# Patient Record
Sex: FEMALE | Race: WHITE | NOT HISPANIC OR LATINO | Employment: OTHER | URBAN - METROPOLITAN AREA
[De-identification: names, ages, dates, MRNs, and addresses within clinical notes are randomized per-mention and may not be internally consistent; named-entity substitution may affect disease eponyms.]

---

## 2017-05-27 ENCOUNTER — TELEPHONE (OUTPATIENT)
Dept: HEMATOLOGY/ONCOLOGY | Facility: CLINIC | Age: 52
End: 2017-05-27

## 2017-06-07 ENCOUNTER — TELEPHONE (OUTPATIENT)
Dept: HEMATOLOGY/ONCOLOGY | Facility: CLINIC | Age: 52
End: 2017-06-07

## 2017-06-07 NOTE — TELEPHONE ENCOUNTER
----- Message from Kalyn Saxena sent at 5/30/2017  9:11 AM CDT -----  No RTC appt      ----- Message -----  From: CHANI Briones MD  Sent: 5/28/2017  10:52 AM  To: Kalyn Saxena    Is she coming in 5/30 week?

## 2017-06-22 ENCOUNTER — OFFICE VISIT (OUTPATIENT)
Dept: HEMATOLOGY/ONCOLOGY | Facility: CLINIC | Age: 52
End: 2017-06-22
Payer: COMMERCIAL

## 2017-06-22 VITALS
RESPIRATION RATE: 18 BRPM | DIASTOLIC BLOOD PRESSURE: 72 MMHG | HEART RATE: 76 BPM | TEMPERATURE: 98 F | WEIGHT: 148 LBS | SYSTOLIC BLOOD PRESSURE: 168 MMHG

## 2017-06-22 DIAGNOSIS — R59.0 LYMPHADENOPATHY, MEDIASTINAL: ICD-10-CM

## 2017-06-22 DIAGNOSIS — E22.2 SYNDROME OF INAPPROPRIATE ADH (SIADH) SECRETION: ICD-10-CM

## 2017-06-22 DIAGNOSIS — G89.29 CHRONIC BILATERAL LOW BACK PAIN WITHOUT SCIATICA: ICD-10-CM

## 2017-06-22 DIAGNOSIS — R91.8 MASS OF LOWER LOBE OF RIGHT LUNG: ICD-10-CM

## 2017-06-22 DIAGNOSIS — M54.50 CHRONIC BILATERAL LOW BACK PAIN WITHOUT SCIATICA: ICD-10-CM

## 2017-06-22 PROCEDURE — 99204 OFFICE O/P NEW MOD 45 MIN: CPT | Mod: ,,, | Performed by: INTERNAL MEDICINE

## 2017-06-22 RX ORDER — HYDROCODONE BITARTRATE AND ACETAMINOPHEN 5; 325 MG/1; MG/1
TABLET ORAL
COMMUNITY
Start: 2017-06-08 | End: 2017-06-30 | Stop reason: SDUPTHER

## 2017-06-22 NOTE — LETTER
June 22, 2017      Ricci Guerra MD  [address]  [facility] [place] [address]           [facility]  [address] [facility] [place] [address]          Patient: Maile Light   MR Number: [id number]   YOB: 1965   Date of Visit: 6/22/2017       Dear Dr. Ricci Guerra:    Thank you for referring Maile Light to me for evaluation. Attached you will find relevant portions of my assessment and plan of care.    If you have questions, please do not hesitate to call me. I look forward to following Maile Light along with you.    Sincerely,    CHANI Briones MD    Enclosure  CC:  No Recipients    If you would like to receive this communication electronically, please contact [email or web address] or [phone] to request more information on [employer] Link access.    For providers and/or their staff who would like to refer a patient to [facility], please contact us through our one-stop-shop provider referral line, [facility], at [phone].    If you feel you have received this communication in error or would no longer like to receive these types of communications, please e-mail [email or web address]

## 2017-06-22 NOTE — LETTER
June 22, 2017        Ricci Guerra MD  [address]  [facility] [place] [address]             [facility]  [address] [facility] [place] [address]   Patient: Maile Light   MR Number: [id number]   YOB: 1965   Date of Visit: 6/22/2017       Dear Dr. Guerra:    Thank you for referring Maile Light to me for evaluation. Attached you will find relevant portions of my assessment and plan of care.    If you have questions, please do not hesitate to call me. I look forward to following Maile Light along with you.    Sincerely,      CHANI Briones MD            CC  No Recipients    Enclosure

## 2017-06-23 ENCOUNTER — TELEPHONE (OUTPATIENT)
Dept: PULMONOLOGY | Facility: CLINIC | Age: 52
End: 2017-06-23

## 2017-06-23 NOTE — PROGRESS NOTES
[facility] History & Physical    Subjective:      Patient ID:   Maile Light  51 y.o. female  1965                                                                                 MD Steve Jennings MD      Chief Complaint:   RLL lung mass    HPI:  51 y.o. female with diagnosis of  Evaluation for elective breast reduction.  Found to have  Na 116-118.  Seen in ER, mass RLL.  SOB +, non productive cough.  No hemoptysis.  Appetite and weight stable.  Smoke 1 ppd x's 25 years.  I spoke with her about [facility], she would like a referral to the clinic.    HTN, LBP,hernia repair x's 7, mesh in place.  She wants breast reduction.    Allergy no.  Job micah,     Extensive abdomenal surgery, colostomy reversal, Dr. Eugene Parrish,   [facility], 3 years ago.  Lumbar back surgery x's 1.    Mom Ca ? Type., paralysis.  Dad GI cancer, colostomy.  10 Br and Sist, heart dx, renal dx, MVA death.    ROS:   GEN: normal without any fever, night sweats or weight loss  HEENT: normal with no HA's, sore throat, stiff neck, changes in vision  CV: normal with no CP, SOB, PND, DE OLIVEIRA or orthopnea  PULM: See HPI.   no SOB, cough, hemoptysis, sputum or pleuritic pain  GI: See HPI.   no abdominal pain, nausea, vomiting, constipation, diarrhea, melanotic stools, BRBPR, or hematemesis  : normal with no hematuria, dysuria  BREAST: normal with no mass, discharge, pain  SKIN: normal with no rash, erythema, bruising, or swelling       Social History     Social History    Marital status:      Spouse name: N/A    Number of children: N/A    Years of education: N/A     Occupational History    Not on file.     Social History Main Topics    Smoking status: Current Every Day Smoker     Types: Cigarettes    Smokeless tobacco: Never Used    Alcohol use Yes    Drug use: No    Sexual activity: No     Other Topics Concern    Not on file     Social History Narrative    No narrative on file          Current Outpatient Prescriptions:     hydrocodone-acetaminophen 5-325mg (NORCO) 5-325 mg per tablet, , Disp: , Rfl:           Objective:   Vitals:  Blood pressure (!) 168/72, pulse 76, temperature 98.3 °F (36.8 °C), resp. rate 18, weight 67.1 kg (148 lb).    Physical Examination:   GEN: no apparent distress, comfortable; AAOx3  HEAD: atraumatic and normocephalic  EYES: no pallor, no icterus, PERRLA  ENT: OMM, no pharyngeal erythema, external ears WNL; no nasal discharge; no thrush  NECK: no masses, thyroid normal, trachea midline, no LAD/LN's, supple  CV: RRR with no murmur; normal pulse; normal S1 and S2; no pedal edema  CHEST: Normal respiratory effort; CTAB; normal breath sounds; no wheeze or crackles  ABDOM: nontender and nondistended; soft; normal bowel sounds; no rebound/guarding  MUSC/Skeletal: ROM normal; no crepitus; joints normal  EXTREM: no clubbing, cyanosis, inflammation or swelling, no edema or calf tenderness  SKIN: She has a sebaceous cyst on posterior R shoulder,  no rashes, lesions, ulcers, petechiae   : no sousa  NEURO: grossly intact; motor/sensory WNL; AAOx3; no tremors  PSYCH: normal mood, affect and behavior  LYMPH: normal cervical, supraclavicular, axillary and groin LN's  Breasts: NT, no palpable mass at L or R breast    I have reviewed all available lab results and radiology reports.    Na 116-118.     Radiology/Diagnostic Studies:    CAT RLL mass, ? RML nodule, increased hilar and mediastinal LN.      All lab results and imaging results have been reviewed and discussed with the patient    Assessment:   (1) 51 y.o. female with diagnosis of hyponatremia, SIADH likely.    (2)RLL mass and local mediastinoscopy, likely Small cell lung Cancer.    (3)Refer Dr. Toleod for bronchoscopy and bx for tissue dx.    (4)Staging studies MRI of brain, PET scan [facility]. Check for metastatic dx?    (5)RTC 2 weeks.    (6)Treatment options could include chemotherapy and radiation concurrently or  sequentially.    (7)Chronic low back pain.        1. Mass of lower lobe of right lung    2. Lymphadenopathy, mediastinal    3. Syndrome of inappropriate ADH (SIADH) secretion    4. Chronic bilateral low back pain without sciatica        Plan:       Mass of lower lobe of right lung    Lymphadenopathy, mediastinal    Syndrome of inappropriate ADH (SIADH) secretion    Chronic bilateral low back pain without sciatica          I have explained and the patient understands all of  the current recommendation(s). I have answered all of their questions to the best of my ability and to their complete satisfaction.             Thank you for allowing me to participate in this pleasant patient's care. Please call with any questions or concerns.

## 2017-06-23 NOTE — TELEPHONE ENCOUNTER
----- Message from Diamond Richie sent at 6/22/2017  4:54 PM CDT -----  Contact: Dr Anselmo Briones calling to schedule a new patient appt. The patient has a right lung mass/ small cancer. She is being referred by Dr CHANI Briones. First avail appt is 8/29/17. She would like to be seen sooner. Please advise.  Call back the patient at .  Thanks!

## 2017-06-23 NOTE — TELEPHONE ENCOUNTER
Dr. GISSELLE Briones's office called and wanted the patient in sooner than 7/25/2017 so I gave her an appt for 7/13/2017 at 8:00am.

## 2017-06-30 ENCOUNTER — TELEPHONE (OUTPATIENT)
Dept: PULMONOLOGY | Facility: CLINIC | Age: 52
End: 2017-06-30

## 2017-06-30 ENCOUNTER — OFFICE VISIT (OUTPATIENT)
Dept: PULMONOLOGY | Facility: CLINIC | Age: 52
End: 2017-06-30
Payer: COMMERCIAL

## 2017-06-30 VITALS
HEIGHT: 59 IN | HEART RATE: 90 BPM | WEIGHT: 147.5 LBS | OXYGEN SATURATION: 98 % | BODY MASS INDEX: 29.73 KG/M2 | DIASTOLIC BLOOD PRESSURE: 93 MMHG | SYSTOLIC BLOOD PRESSURE: 170 MMHG

## 2017-06-30 DIAGNOSIS — E22.2 SYNDROME OF INAPPROPRIATE ADH (SIADH) SECRETION: ICD-10-CM

## 2017-06-30 DIAGNOSIS — F17.200 SMOKER: ICD-10-CM

## 2017-06-30 DIAGNOSIS — E22.2 SIADH (SYNDROME OF INAPPROPRIATE ADH PRODUCTION): ICD-10-CM

## 2017-06-30 DIAGNOSIS — R05.3 CHRONIC COUGH: ICD-10-CM

## 2017-06-30 DIAGNOSIS — J44.1 COPD EXACERBATION: ICD-10-CM

## 2017-06-30 DIAGNOSIS — F41.9 ANXIETY: ICD-10-CM

## 2017-06-30 DIAGNOSIS — R59.0 LYMPHADENOPATHY, MEDIASTINAL: Primary | ICD-10-CM

## 2017-06-30 DIAGNOSIS — R91.8 MASS OF UPPER LOBE OF RIGHT LUNG: ICD-10-CM

## 2017-06-30 PROCEDURE — 99205 OFFICE O/P NEW HI 60 MIN: CPT | Mod: S$GLB,,, | Performed by: INTERNAL MEDICINE

## 2017-06-30 PROCEDURE — 99999 PR PBB SHADOW E&M-EST. PATIENT-LVL III: CPT | Mod: PBBFAC,,, | Performed by: INTERNAL MEDICINE

## 2017-06-30 RX ORDER — CODEINE PHOSPHATE AND GUAIFENESIN 10; 100 MG/5ML; MG/5ML
5 SOLUTION ORAL EVERY 4 HOURS PRN
Qty: 473 ML | Refills: 0 | Status: SHIPPED | OUTPATIENT
Start: 2017-06-30 | End: 2017-07-10

## 2017-06-30 RX ORDER — ALPRAZOLAM 0.25 MG/1
0.25 TABLET ORAL 3 TIMES DAILY PRN
Qty: 90 TABLET | Refills: 2 | Status: SHIPPED | OUTPATIENT
Start: 2017-06-30 | End: 2017-08-07

## 2017-06-30 RX ORDER — ALBUTEROL SULFATE 90 UG/1
AEROSOL, METERED RESPIRATORY (INHALATION)
Qty: 1 INHALER | Refills: 11 | Status: SHIPPED | OUTPATIENT
Start: 2017-06-30

## 2017-06-30 RX ORDER — FLUTICASONE FUROATE AND VILANTEROL 200; 25 UG/1; UG/1
1 POWDER RESPIRATORY (INHALATION) DAILY
Qty: 1 EACH | Refills: 11 | Status: SHIPPED | OUTPATIENT
Start: 2017-06-30

## 2017-06-30 RX ORDER — PREDNISONE 20 MG/1
TABLET ORAL
Qty: 24 TABLET | Refills: 0 | Status: SHIPPED | OUTPATIENT
Start: 2017-06-30

## 2017-06-30 RX ORDER — HYDROCODONE BITARTRATE AND ACETAMINOPHEN 5; 325 MG/1; MG/1
1 TABLET ORAL EVERY 4 HOURS PRN
Qty: 150 TABLET | Refills: 0 | Status: SHIPPED | OUTPATIENT
Start: 2017-06-30 | End: 2017-08-03 | Stop reason: SDUPTHER

## 2017-06-30 NOTE — PATIENT INSTRUCTIONS
Anxiety - use xanax as needed up to 3 times daily    Cough suppression - use guafenesin and codeine as needed,  May use norco next month after July 8, do not get narcotics from more than one doc.  Get future meds perhaps from Dr Briones or Dr Milian.        For bronchial problem   breo one a day   Albuterol as needed - 2 every 4 for cough or wheezes.   Prednisone 2 daily  fro 3 days and then one daily for 3 days.    Fluid restrict to one liter fluid total daily, check sodium Monday.    Scope test Monday with biopsy right upper lung anteriorly and do needle biopsy large lymph node.        Your xays and picture looks like small cell lung cancer.  Some of cough is chronic from bronchitis, but with coughing causing vomiting and apparent cancer- would try to suppress as able with narcotics.

## 2017-06-30 NOTE — TELEPHONE ENCOUNTER
----- Message from Steve Toledo MD sent at 6/30/2017  4:25 PM CDT -----  Contact: Shila with [email or web address]  Ok    ----- Message -----  From: Bonny Foote MA  Sent: 6/30/2017   4:01 PM  To: Steve Toledo MD        ----- Message -----  From: Noe Watts  Sent: 6/30/2017   3:47 PM  To: Tre BOLIVAR Staff    Shila with [email or web address] pharmacy called to verify if script for cough syrup can be changed to Robitussin ac? Please call back at [phone] to advise

## 2017-06-30 NOTE — LETTER
June 30, 2017      CHANI Briones MD  [address]  [address]  [place] [address]           [facility] [facility]  [address]  [place] [address]  Phone: [phone]          Patient: Maile Light   MR Number: [id number]   YOB: 1965   Date of Visit: 6/30/2017       Dear Dr. CHANI Briones:    Thank you for referring Maile Light to me for evaluation. Attached you will find relevant portions of my assessment and plan of care.    If you have questions, please do not hesitate to call me. I look forward to following Maile Light along with you.    Sincerely,    Steve Toledo MD    Enclosure  CC:  No Recipients    If you would like to receive this communication electronically, please contact [email or web address] or [phone] to request more information on [employer] Link access.    For providers and/or their staff who would like to refer a patient to [facility], please contact us through our one-stop-shop provider referral line, [facility] , at [phone].    If you feel you have received this communication in error or would no longer like to receive these types of communications, please e-mail [email or web address]

## 2017-06-30 NOTE — PROGRESS NOTES
"2017    Maile Light  [facility] Patient Consult    Chief Complaint   Patient presents with    Lung Mass       HPI:chronic smoker, no family history, mom  age 30, pt has had chronic cough for years, had colon surg 3 yrs ago with ventral hernias, pt had bowel obstruction.  Had problems with nocturnal wheezes and sob - no resp rx. No fh of asthma.     Pt was found to have low sodium.  cxr and ct done May 17th.  Na 116 to 118.  No bx. Done. Seen by dr Tre Briones. Needs dx.     Cough violent with emesis, no syncope        The chief compliant  problem is new to me",   PFSH:  History reviewed. No pertinent past medical history.      Past Surgical History:   Procedure Laterality Date     SECTION      COLON SURGERY      COLONOSCOPY      HERNIA MESH REMOVAL       Social History   Substance Use Topics    Smoking status: Current Every Day Smoker     Packs/day: 0.50     Years: 25.00     Types: Cigarettes    Smokeless tobacco: Never Used    Alcohol use Yes     Family History   Problem Relation Age of Onset    Cancer Mother     Cancer Father      Review of patient's allergies indicates:  No Known Allergies    Performance Status:The patient's activity level is functions out of house.      Review of Systems:  a review of eleven systems covering constitutional, Eye, HEENT, Psych, Respiratory, Cardiac, GI, , Musculoskeletal, Endocrine, Dermatologic was negative except for pertinent findings as listed ABOVE and below: all good     Exam:Comprehensive exam done. BP (!) 170/93 (BP Location: Left arm, Patient Position: Sitting, BP Method: Automatic)   Pulse 90   Ht 4' 11" (1.499 m)   Wt 66.9 kg (147 lb 7.8 oz)   SpO2 98%   BMI 29.79 kg/m²   Exam included Vitals as listed, and patient's appearance and affect and alertness and mood, oral exam for yeast and hygiene and pharynx lesions and Mallapatti (M) score, neck with inspection for jvd and masses and thyroid abnormalities and lymph nodes (supraclavicular " and infraclavicular nodes and axillary also examined and noted if abn), chest exam included symmetry and effort and fremitus and percussion and auscultation, cardiac exam included rhythm and gallops and murmur and rubs and jvd and edema, abdominal exam for mass and hepatosplenomegaly and tenderness and hernias and bowel sounds, Musculoskeletal exam with muscle tone and posture and mobility/gait and  strength, and skin for rashes and cyanosis and pallor and turgor, extremity for clubbing.  Findings were normal except for pertinent findings listed below: violent cough , good exam.         Radiographs (ct chest and cxr) reviewed: view by direct vision  May 17  2017 mediastinal mass and rul mass and patchy left upper abn    Labs none available        PFT was not done     Plan:  Clinical impression is resonably certain and repeated evaluation prn +/- follow up will be needed as below.    Maile was seen today for lung mass.    Diagnoses and all orders for this visit:    Lymphadenopathy, mediastinal  -     guaifenesin-codeine 100-10 mg/5 ml (TUSSI-ORGANIDIN NR)  mg/5 mL syrup; Take 5 mLs by mouth every 4 (four) hours as needed for Cough.  -     hydrocodone-acetaminophen 5-325mg (NORCO) 5-325 mg per tablet; Take 1 tablet by mouth every 4 (four) hours as needed (cough).  -     BRONCHOSCOPY HIRSCH NEEDLE TRANSBRONCHIAL; Future    Syndrome of inappropriate ADH (SIADH) secretion    Mass of upper lobe of right lung  -     guaifenesin-codeine 100-10 mg/5 ml (TUSSI-ORGANIDIN NR)  mg/5 mL syrup; Take 5 mLs by mouth every 4 (four) hours as needed for Cough.  -     hydrocodone-acetaminophen 5-325mg (NORCO) 5-325 mg per tablet; Take 1 tablet by mouth every 4 (four) hours as needed (cough).  -     BRONCHOSCOPY HIRSCH NEEDLE TRANSBRONCHIAL; Future    COPD exacerbation  -     fluticasone-vilanterol (BREO ELLIPTA) 200-25 mcg/dose DsDv diskus inhaler; Inhale 1 puff into the lungs once daily.  -     guaifenesin-codeine 100-10  mg/5 ml (TUSSI-ORGANIDIN NR)  mg/5 mL syrup; Take 5 mLs by mouth every 4 (four) hours as needed for Cough.  -     albuterol 90 mcg/actuation inhaler; 2 puffs every 4 hours as needed for cough, wheeze, or shortness of breath  -     predniSONE (DELTASONE) 20 MG tablet; Take 2 daily for 3 days then One daily for 3 days and repeat for flare of lung symptoms as intructed    Chronic cough  -     guaifenesin-codeine 100-10 mg/5 ml (TUSSI-ORGANIDIN NR)  mg/5 mL syrup; Take 5 mLs by mouth every 4 (four) hours as needed for Cough.  -     hydrocodone-acetaminophen 5-325mg (NORCO) 5-325 mg per tablet; Take 1 tablet by mouth every 4 (four) hours as needed (cough).    Smoker  -     guaifenesin-codeine 100-10 mg/5 ml (TUSSI-ORGANIDIN NR)  mg/5 mL syrup; Take 5 mLs by mouth every 4 (four) hours as needed for Cough.    SIADH (syndrome of inappropriate ADH production)  -     Basic metabolic panel; Future    Anxiety  -     alprazolam (XANAX) 0.25 MG tablet; Take 1 tablet (0.25 mg total) by mouth 3 (three) times daily as needed for Anxiety.        Return in about 4 weeks (around 7/28/2017), or if symptoms worsen or fail to improve.    Discussed with patient above for education the following:    Anxiety - use xanax as needed up to 3 times daily    Cough suppression - use guafenesin and codeine as needed,  May use norco next month after July 8, do not get narcotics from more than one doc.  Get future meds perhaps from Dr Briones or Dr Milian.        For bronchial problem   breo one a day   Albuterol as needed - 2 every 4 for cough or wheezes.   Prednisone 2 daily  fro 3 days and then one daily for 3 days.    Fluid restrict to one liter fluid total daily, check sodium Monday.    Scope test Monday with biopsy right upper lung anteriorly and do needle biopsy large lymph node.

## 2017-07-03 ENCOUNTER — SURGERY (OUTPATIENT)
Age: 52
End: 2017-07-03

## 2017-07-03 ENCOUNTER — ANESTHESIA (OUTPATIENT)
Dept: ENDOSCOPY | Facility: HOSPITAL | Age: 52
End: 2017-07-03
Payer: COMMERCIAL

## 2017-07-03 ENCOUNTER — ANESTHESIA EVENT (OUTPATIENT)
Dept: ENDOSCOPY | Facility: HOSPITAL | Age: 52
End: 2017-07-03
Payer: COMMERCIAL

## 2017-07-03 ENCOUNTER — HOSPITAL ENCOUNTER (OUTPATIENT)
Facility: HOSPITAL | Age: 52
Discharge: HOME | End: 2017-07-03
Attending: INTERNAL MEDICINE | Admitting: INTERNAL MEDICINE
Payer: COMMERCIAL

## 2017-07-03 DIAGNOSIS — R91.8 LUNG MASS: ICD-10-CM

## 2017-07-03 DIAGNOSIS — E22.2 SIADH (SYNDROME OF INAPPROPRIATE ADH PRODUCTION): ICD-10-CM

## 2017-07-03 DIAGNOSIS — R59.0 MEDIASTINAL ADENOPATHY: ICD-10-CM

## 2017-07-03 LAB
ANION GAP SERPL CALC-SCNC: 12 MMOL/L
BUN SERPL-MCNC: 13 MG/DL
CALCIUM SERPL-MCNC: 8.9 MG/DL
CHLORIDE SERPL-SCNC: 94 MMOL/L
CO2 SERPL-SCNC: 22 MMOL/L
CREAT SERPL-MCNC: 0.7 MG/DL
EST. GFR  (AFRICAN AMERICAN): >60 ML/MIN/1.73 M^2
EST. GFR  (NON AFRICAN AMERICAN): >60 ML/MIN/1.73 M^2
GLUCOSE SERPL-MCNC: 111 MG/DL
POTASSIUM SERPL-SCNC: 3.8 MMOL/L
SODIUM SERPL-SCNC: 128 MMOL/L

## 2017-07-03 PROCEDURE — 88305 TISSUE EXAM BY PATHOLOGIST: CPT | Performed by: PATHOLOGY

## 2017-07-03 PROCEDURE — 37000008 HC ANESTHESIA 1ST 15 MINUTES: Performed by: INTERNAL MEDICINE

## 2017-07-03 PROCEDURE — 87015 SPECIMEN INFECT AGNT CONCNTJ: CPT

## 2017-07-03 PROCEDURE — 31624 DX BRONCHOSCOPE/LAVAGE: CPT | Mod: 51,RT,, | Performed by: INTERNAL MEDICINE

## 2017-07-03 PROCEDURE — 87102 FUNGUS ISOLATION CULTURE: CPT

## 2017-07-03 PROCEDURE — 25000003 PHARM REV CODE 250: Performed by: NURSE ANESTHETIST, CERTIFIED REGISTERED

## 2017-07-03 PROCEDURE — D9220A PRA ANESTHESIA: Mod: ANES,,, | Performed by: ANESTHESIOLOGY

## 2017-07-03 PROCEDURE — 31629 BRONCHOSCOPY/NEEDLE BX EACH: CPT | Performed by: INTERNAL MEDICINE

## 2017-07-03 PROCEDURE — 94640 AIRWAY INHALATION TREATMENT: CPT

## 2017-07-03 PROCEDURE — 63600175 PHARM REV CODE 636 W HCPCS: Performed by: NURSE ANESTHETIST, CERTIFIED REGISTERED

## 2017-07-03 PROCEDURE — 37000009 HC ANESTHESIA EA ADD 15 MINS: Performed by: INTERNAL MEDICINE

## 2017-07-03 PROCEDURE — 80048 BASIC METABOLIC PNL TOTAL CA: CPT

## 2017-07-03 PROCEDURE — D9220A PRA ANESTHESIA: Mod: CRNA,,,

## 2017-07-03 PROCEDURE — 31628 BRONCHOSCOPY/LUNG BX EACH: CPT | Performed by: INTERNAL MEDICINE

## 2017-07-03 PROCEDURE — 88342 IMHCHEM/IMCYTCHM 1ST ANTB: CPT | Mod: 26,,, | Performed by: PATHOLOGY

## 2017-07-03 PROCEDURE — 87107 FUNGI IDENTIFICATION MOLD: CPT

## 2017-07-03 PROCEDURE — 27200944 HC BRONCH FORCEPS DISPOSABLE: Performed by: INTERNAL MEDICINE

## 2017-07-03 PROCEDURE — 87205 SMEAR GRAM STAIN: CPT

## 2017-07-03 PROCEDURE — 88112 CYTOPATH CELL ENHANCE TECH: CPT | Mod: 26,,, | Performed by: PATHOLOGY

## 2017-07-03 PROCEDURE — 25000003 PHARM REV CODE 250: Performed by: INTERNAL MEDICINE

## 2017-07-03 PROCEDURE — 36415 COLL VENOUS BLD VENIPUNCTURE: CPT

## 2017-07-03 PROCEDURE — 27202059 HC NEEDLE, FNA (ANY): Performed by: INTERNAL MEDICINE

## 2017-07-03 PROCEDURE — 87070 CULTURE OTHR SPECIMN AEROBIC: CPT

## 2017-07-03 PROCEDURE — 25000242 PHARM REV CODE 250 ALT 637 W/ HCPCS: Performed by: INTERNAL MEDICINE

## 2017-07-03 PROCEDURE — 87116 MYCOBACTERIA CULTURE: CPT

## 2017-07-03 PROCEDURE — 31624 DX BRONCHOSCOPE/LAVAGE: CPT | Performed by: INTERNAL MEDICINE

## 2017-07-03 PROCEDURE — 31628 BRONCHOSCOPY/LUNG BX EACH: CPT | Mod: 59,RT,, | Performed by: INTERNAL MEDICINE

## 2017-07-03 PROCEDURE — 88341 IMHCHEM/IMCYTCHM EA ADD ANTB: CPT | Mod: 26,,, | Performed by: PATHOLOGY

## 2017-07-03 PROCEDURE — 31629 BRONCHOSCOPY/NEEDLE BX EACH: CPT | Mod: ,,, | Performed by: INTERNAL MEDICINE

## 2017-07-03 RX ORDER — LIDOCAINE HYDROCHLORIDE 40 MG/ML
4 INJECTION, SOLUTION RETROBULBAR ONCE
Status: COMPLETED | OUTPATIENT
Start: 2017-07-03 | End: 2017-07-03

## 2017-07-03 RX ORDER — LIDOCAINE HYDROCHLORIDE 20 MG/ML
JELLY TOPICAL
Status: DISCONTINUED
Start: 2017-07-03 | End: 2017-07-03 | Stop reason: HOSPADM

## 2017-07-03 RX ORDER — KETAMINE HYDROCHLORIDE 100 MG/ML
INJECTION, SOLUTION INTRAMUSCULAR; INTRAVENOUS
Status: DISCONTINUED | OUTPATIENT
Start: 2017-07-03 | End: 2017-07-03

## 2017-07-03 RX ORDER — LEVALBUTEROL 1.25 MG/.5ML
1.25 SOLUTION, CONCENTRATE RESPIRATORY (INHALATION)
Status: COMPLETED | OUTPATIENT
Start: 2017-07-03 | End: 2017-07-03

## 2017-07-03 RX ORDER — LIDOCAINE HCL/PF 100 MG/5ML
SYRINGE (ML) INTRAVENOUS
Status: DISCONTINUED | OUTPATIENT
Start: 2017-07-03 | End: 2017-07-03

## 2017-07-03 RX ORDER — PROPOFOL 10 MG/ML
VIAL (ML) INTRAVENOUS
Status: DISCONTINUED | OUTPATIENT
Start: 2017-07-03 | End: 2017-07-03

## 2017-07-03 RX ORDER — IPRATROPIUM BROMIDE AND ALBUTEROL SULFATE 2.5; .5 MG/3ML; MG/3ML
3 SOLUTION RESPIRATORY (INHALATION) ONCE
Status: COMPLETED | OUTPATIENT
Start: 2017-07-03 | End: 2017-07-03

## 2017-07-03 RX ORDER — SODIUM CHLORIDE 9 MG/ML
INJECTION, SOLUTION INTRAVENOUS CONTINUOUS
Status: DISCONTINUED | OUTPATIENT
Start: 2017-07-03 | End: 2017-07-03 | Stop reason: HOSPADM

## 2017-07-03 RX ORDER — LIDOCAINE HYDROCHLORIDE 10 MG/ML
INJECTION, SOLUTION EPIDURAL; INFILTRATION; INTRACAUDAL; PERINEURAL
Status: DISCONTINUED
Start: 2017-07-03 | End: 2017-07-03 | Stop reason: HOSPADM

## 2017-07-03 RX ORDER — LIDOCAINE HYDROCHLORIDE 40 MG/ML
INJECTION, SOLUTION RETROBULBAR
Status: DISCONTINUED
Start: 2017-07-03 | End: 2017-07-03 | Stop reason: HOSPADM

## 2017-07-03 RX ADMIN — LIDOCAINE HYDROCHLORIDE 4 ML: 40 INJECTION, SOLUTION RETROBULBAR; TOPICAL at 08:07

## 2017-07-03 RX ADMIN — PROPOFOL 100 MG: 10 INJECTION, EMULSION INTRAVENOUS at 08:07

## 2017-07-03 RX ADMIN — LEVALBUTEROL 1.25 MG: 1.25 SOLUTION, CONCENTRATE RESPIRATORY (INHALATION) at 08:07

## 2017-07-03 RX ADMIN — PROPOFOL 50 MG: 10 INJECTION, EMULSION INTRAVENOUS at 08:07

## 2017-07-03 RX ADMIN — SODIUM CHLORIDE: 0.9 INJECTION, SOLUTION INTRAVENOUS at 08:07

## 2017-07-03 RX ADMIN — IPRATROPIUM BROMIDE AND ALBUTEROL SULFATE 3 ML: .5; 3 SOLUTION RESPIRATORY (INHALATION) at 10:07

## 2017-07-03 RX ADMIN — KETAMINE HYDROCHLORIDE 30 MG: 100 INJECTION, SOLUTION, CONCENTRATE INTRAMUSCULAR; INTRAVENOUS at 08:07

## 2017-07-03 RX ADMIN — LIDOCAINE HYDROCHLORIDE 50 MG: 20 INJECTION, SOLUTION INTRAVENOUS at 08:07

## 2017-07-03 NOTE — ANESTHESIA PREPROCEDURE EVALUATION
07/03/2017  Maile Light is a 51 y.o., female.    Anesthesia Evaluation    I have reviewed the Patient Summary Reports.    I have reviewed the Nursing Notes.      Review of Systems  Anesthesia Hx:  No problems with previous Anesthesia    Pulmonary:   COPD, mild Asthma asymptomatic        Physical Exam  General:  Well nourished                 Anesthesia Plan  Type of Anesthesia, risks & benefits discussed:  Anesthesia Type:  general  Patient's Preference:   Intra-op Monitoring Plan:   Intra-op Monitoring Plan Comments:   Post Op Pain Control Plan:   Post Op Pain Control Plan Comments:   Induction:   IV  Beta Blocker:  Patient is not currently on a Beta-Blocker (No further documentation required).       Informed Consent: Patient understands risks and agrees with Anesthesia plan.  Questions answered. Anesthesia consent signed with patient.  ASA Score: 2     Day of Surgery Review of History & Physical:    H&P update referred to the surgeon.         Ready For Surgery From Anesthesia Perspective.

## 2017-07-03 NOTE — PROCEDURES
7/3/2017      After update h and p, informed consent, review of record, time out, sedation by anesthesia, and usual procedures- pt underwent left nasal fiberoptic bronchoscopy.   Larynx was wnl. The right distal trachea had loss of ring definition/with fixation of right side of airway with coughing.  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   Right upper lung anterior segment looked wnl.      Transtracheal needle bx done 3-4 rings above gracy at 1-2 oclock for 3 passes with good tissue samples apparent.     Lavage of right upper lobe anterior segment with 60 cc in and 20 cc out done.    Transbronchial biopsy done >6 with  floro from dense abnormality in right upper lung ant segment with no apparent problems.    ebl was 10 cc. No adverse effect.  No pneumo on floro.  Post cxr pending.    Needle for cytology and path, bx for path, lavage for afb and culture and cytology and fungal eval.

## 2017-07-03 NOTE — OR NURSING
Spoke with Dr Toledo via phone, updated pts status incl. breathing Tx, Labs and X Ray results. Okd to release to home.

## 2017-07-03 NOTE — ANESTHESIA POSTPROCEDURE EVALUATION
"Anesthesia Post Evaluation    Patient: Maile Light    Procedure(s) Performed: Procedure(s) (LRB):  BRONCHOSCOPY with fluoro (N/A)    Final Anesthesia Type: general  Patient location during evaluation: PACU  Patient participation: Yes- Able to Participate  Level of consciousness: awake and alert and oriented  Post-procedure vital signs: reviewed and stable  Pain management: adequate  Airway patency: patent  PONV status at discharge: No PONV  Anesthetic complications: no      Cardiovascular status: blood pressure returned to baseline  Respiratory status: unassisted, spontaneous ventilation and room air  Hydration status: euvolemic  Follow-up not needed.        Visit Vitals  BP (!) 127/97   Pulse (!) 14   Temp 36.6 °C (97.9 °F) (Oral)   Resp (!) 23   Ht 5' 1" (1.549 m)   Wt 67.1 kg (148 lb)   SpO2 97%   BMI 27.96 kg/m²       Pain/Ana Score: Pain Assessment Performed: Yes (7/3/2017  7:54 AM)  Presence of Pain: denies (7/3/2017  7:54 AM)      "

## 2017-07-03 NOTE — TRANSFER OF CARE
"Anesthesia Transfer of Care Note    Patient: Maile Light    Procedure(s) Performed: Procedure(s) (LRB):  BRONCHOSCOPY with fluoro (N/A)    Patient location: GI    Anesthesia Type: general    Transport from OR: Transported from OR on room air with adequate spontaneous ventilation    Post pain: adequate analgesia    Post assessment: no apparent anesthetic complications    Post vital signs: stable    Level of consciousness: awake    Nausea/Vomiting: no nausea/vomiting    Complications: none    Transfer of care protocol was followed      Last vitals:   Visit Vitals  BP (!) 182/81   Pulse 92   Temp 36.6 °C (97.9 °F) (Oral)   Resp (!) 21   Ht 5' 1" (1.549 m)   Wt 67.1 kg (148 lb)   BMI 27.96 kg/m²     "

## 2017-07-05 VITALS
TEMPERATURE: 98 F | BODY MASS INDEX: 27.94 KG/M2 | OXYGEN SATURATION: 96 % | HEIGHT: 61 IN | HEART RATE: 100 BPM | DIASTOLIC BLOOD PRESSURE: 67 MMHG | RESPIRATION RATE: 18 BRPM | SYSTOLIC BLOOD PRESSURE: 165 MMHG | WEIGHT: 148 LBS

## 2017-07-05 DIAGNOSIS — R59.0 MEDIASTINAL ADENOPATHY: ICD-10-CM

## 2017-07-05 DIAGNOSIS — R91.8 MASS OF UPPER LOBE OF RIGHT LUNG: ICD-10-CM

## 2017-07-05 DIAGNOSIS — L02.91 ABSCESS: Primary | ICD-10-CM

## 2017-07-05 DIAGNOSIS — R91.8 LUNG MASS: Primary | ICD-10-CM

## 2017-07-05 DIAGNOSIS — J98.59 MEDIASTINAL MASS: ICD-10-CM

## 2017-07-05 LAB — GLUCOSE SERPL-MCNC: 92 MG/DL (ref 70–99)

## 2017-07-06 ENCOUNTER — TELEPHONE (OUTPATIENT)
Dept: HEMATOLOGY/ONCOLOGY | Facility: CLINIC | Age: 52
End: 2017-07-06

## 2017-07-06 LAB
BACTERIA SPEC AEROBE CULT: NORMAL
GRAM STN SPEC: NORMAL

## 2017-07-10 PROBLEM — J98.59 MEDIASTINAL MASS: Status: ACTIVE | Noted: 2017-07-10

## 2017-07-11 ENCOUNTER — OFFICE VISIT (OUTPATIENT)
Dept: PULMONOLOGY | Facility: CLINIC | Age: 52
End: 2017-07-11
Payer: COMMERCIAL

## 2017-07-11 ENCOUNTER — TELEPHONE (OUTPATIENT)
Dept: PULMONOLOGY | Facility: CLINIC | Age: 52
End: 2017-07-11

## 2017-07-11 ENCOUNTER — HOSPITAL ENCOUNTER (OUTPATIENT)
Dept: RADIOLOGY | Facility: HOSPITAL | Age: 52
Discharge: HOME | End: 2017-07-11
Attending: INTERNAL MEDICINE
Payer: COMMERCIAL

## 2017-07-11 VITALS
HEIGHT: 61 IN | WEIGHT: 147.06 LBS | OXYGEN SATURATION: 97 % | HEART RATE: 83 BPM | DIASTOLIC BLOOD PRESSURE: 89 MMHG | BODY MASS INDEX: 27.77 KG/M2 | SYSTOLIC BLOOD PRESSURE: 177 MMHG

## 2017-07-11 DIAGNOSIS — R91.8 MASS OF UPPER LOBE OF RIGHT LUNG: ICD-10-CM

## 2017-07-11 DIAGNOSIS — R50.9 FEVER, UNSPECIFIED FEVER CAUSE: ICD-10-CM

## 2017-07-11 DIAGNOSIS — R11.2 NAUSEA AND VOMITING, INTRACTABILITY OF VOMITING NOT SPECIFIED, UNSPECIFIED VOMITING TYPE: ICD-10-CM

## 2017-07-11 DIAGNOSIS — E22.2 SIADH (SYNDROME OF INAPPROPRIATE ADH PRODUCTION): Primary | ICD-10-CM

## 2017-07-11 DIAGNOSIS — R59.0 MEDIASTINAL ADENOPATHY: ICD-10-CM

## 2017-07-11 PROCEDURE — 71020 XR CHEST PA AND LATERAL: CPT | Mod: TC

## 2017-07-11 PROCEDURE — 71020 XR CHEST PA AND LATERAL: CPT | Mod: 26,,, | Performed by: RADIOLOGY

## 2017-07-11 PROCEDURE — 99214 OFFICE O/P EST MOD 30 MIN: CPT | Mod: S$GLB,,, | Performed by: INTERNAL MEDICINE

## 2017-07-11 PROCEDURE — 99999 PR PBB SHADOW E&M-EST. PATIENT-LVL III: CPT | Mod: PBBFAC,,, | Performed by: INTERNAL MEDICINE

## 2017-07-11 RX ORDER — LEVOFLOXACIN 500 MG/1
500 TABLET, FILM COATED ORAL DAILY
Qty: 10 TABLET | Refills: 1 | Status: ON HOLD | OUTPATIENT
Start: 2017-07-11 | End: 2017-08-09

## 2017-07-11 RX ORDER — PROMETHAZINE HYDROCHLORIDE 12.5 MG/1
12.5 TABLET ORAL EVERY 4 HOURS PRN
Qty: 60 TABLET | Refills: 0 | Status: SHIPPED | OUTPATIENT
Start: 2017-07-11

## 2017-07-11 NOTE — PATIENT INSTRUCTIONS
Bronchoscopy did show evidence for small cell lung cancer, but not enough to treat.  Treatment works but disease may come back - If diagnosis is small cell lung cancer.    Need mediastinal scope with surgeon to diagnose.    Nausea cause not clear, use promethazine if nausea - may make sleepy.    Check blood and chest xray.    If fever/sweats/chills recur- may have lung infection.  Will give script for antibiotic, levaquin, start if instructed or bad fever, etc..

## 2017-07-11 NOTE — PROGRESS NOTES
"2017    Maile Light  New Patient Consult    Chief Complaint   Patient presents with    Follow-up     per Dr. Toledo    Lung Mass    COPD   2017 needle bx with likely sm cell, but only cell cluster on needle, bx non diagnostic,  Had n/v 2 days ago with low grade temp.  No bowel problems or distention with n/v and good bm.    2017HPI:chronic smoker, no family history, mom  age 30, pt has had chronic cough for years, had colon surg 3 yrs ago with ventral hernias, pt had bowel obstruction.  Had problems with nocturnal wheezes and sob - no resp rx. No fh of asthma.     Pt was found to have low sodium.  cxr and ct done May 17th.  Na 116 to 118.  No bx. Done. Seen by dr Tre Briones. Needs dx.     Cough violent with emesis, no syncope        The chief compliant  problem is new to me",   PFSH:  History reviewed. No pertinent past medical history.      Past Surgical History:   Procedure Laterality Date     SECTION      COLON SURGERY      COLONOSCOPY      HERNIA MESH REMOVAL       Social History   Substance Use Topics    Smoking status: Current Every Day Smoker     Packs/day: 0.50     Years: 25.00     Types: Cigarettes    Smokeless tobacco: Never Used    Alcohol use Yes     Family History   Problem Relation Age of Onset    Cancer Mother     Cancer Father      Review of patient's allergies indicates:  No Known Allergies    Performance Status:The patient's activity level is functions out of house.      Review of Systems:  a review of eleven systems covering constitutional, Eye, HEENT, Psych, Respiratory, Cardiac, GI, , Musculoskeletal, Endocrine, Dermatologic was negative except for pertinent findings as listed ABOVE and below: all good     Exam:Comprehensive exam done. BP (!) 177/89 (BP Location: Left arm, Patient Position: Sitting, BP Method: Automatic)   Pulse 83   Ht 5' 1" (1.549 m)   Wt 66.7 kg (147 lb 0.8 oz)   SpO2 97%   BMI 27.78 kg/m²   Exam included Vitals as " listed, and patient's appearance and affect and alertness and mood, oral exam for yeast and hygiene and pharynx lesions and Mallapatti (M) score, neck with inspection for jvd and masses and thyroid abnormalities and lymph nodes (supraclavicular and infraclavicular nodes and axillary also examined and noted if abn), chest exam included symmetry and effort and fremitus and percussion and auscultation, cardiac exam included rhythm and gallops and murmur and rubs and jvd and edema, abdominal exam for mass and hepatosplenomegaly and tenderness and hernias and bowel sounds, Musculoskeletal exam with muscle tone and posture and mobility/gait and  strength, and skin for rashes and cyanosis and pallor and turgor, extremity for clubbing.  Findings were normal except for pertinent findings listed below: violent cough resolved, M3, good exam, slight  Rhonchi right         Radiographs (ct chest and cxr) reviewed: view by direct vision  May 17  2017 mediastinal mass and rul mass and patchy left upper abn    Labs sodium 128 last wk    PFT was not done     Plan:  Clinical impression is resonably certain and repeated evaluation prn +/- follow up will be needed as below.    Maile was seen today for follow-up, lung mass and copd.    Diagnoses and all orders for this visit:    SIADH (syndrome of inappropriate ADH production)  -     Comprehensive metabolic panel; Future    Mass of upper lobe of right lung  -     Comprehensive metabolic panel; Future  -     promethazine (PHENERGAN) 12.5 MG Tab; Take 1 tablet (12.5 mg total) by mouth every 4 (four) hours as needed (nausea).  -     CBC auto differential; Future  -     X-Ray Chest PA And Lateral; Future  -     levoFLOXacin (LEVAQUIN) 500 MG tablet; Take 1 tablet (500 mg total) by mouth once daily.    Mediastinal adenopathy    Nausea and vomiting, intractability of vomiting not specified, unspecified vomiting type  -     Comprehensive metabolic panel; Future  -     promethazine  (PHENERGAN) 12.5 MG Tab; Take 1 tablet (12.5 mg total) by mouth every 4 (four) hours as needed (nausea).  -     CBC auto differential; Future  -     X-Ray Chest PA And Lateral; Future  -     levoFLOXacin (LEVAQUIN) 500 MG tablet; Take 1 tablet (500 mg total) by mouth once daily.    Fever, unspecified fever cause  -     CBC auto differential; Future  -     X-Ray Chest PA And Lateral; Future  -     levoFLOXacin (LEVAQUIN) 500 MG tablet; Take 1 tablet (500 mg total) by mouth once daily.        No Follow-up on file.    Discussed with patient above for education the following:      Bronchoscopy did show evidence for small cell lung cancer, but not enough to treat.  Treatment works but disease may come back - If diagnosis is small cell lung cancer.    Need mediastinal scope with surgeon to diagnose.    Nausea cause not clear, use promethazine if nausea - may make sleepy.    Check blood and chest xray.    If fever/sweats/chills recur- may have lung infection.  Will give script for antibiotic, levaquin, start if instructed or bad fever, etc..

## 2017-07-11 NOTE — TELEPHONE ENCOUNTER
Pt notified. Re advised to monitor fluid intake closely and to keep it at a quart/day, and to make sure she starts abx at first sign of problems. Pt understood.     ----- Message from Steve Toledo MD sent at 7/11/2017  1:46 PM CDT -----  Sodium a little lower at 123, reenforce fluid restrict to a quart daily. cxr looks better - re enforce to start levaquin if fever or chills.

## 2017-07-12 ENCOUNTER — OFFICE VISIT (OUTPATIENT)
Dept: HEMATOLOGY/ONCOLOGY | Facility: CLINIC | Age: 52
End: 2017-07-12
Payer: COMMERCIAL

## 2017-07-12 VITALS
RESPIRATION RATE: 18 BRPM | BODY MASS INDEX: 28.02 KG/M2 | SYSTOLIC BLOOD PRESSURE: 178 MMHG | DIASTOLIC BLOOD PRESSURE: 91 MMHG | HEART RATE: 88 BPM | TEMPERATURE: 98 F | WEIGHT: 148.31 LBS

## 2017-07-12 DIAGNOSIS — R91.8 MASS OF LOWER LOBE OF RIGHT LUNG: Primary | ICD-10-CM

## 2017-07-12 DIAGNOSIS — M54.50 CHRONIC BILATERAL LOW BACK PAIN WITHOUT SCIATICA: ICD-10-CM

## 2017-07-12 DIAGNOSIS — G89.29 CHRONIC BILATERAL LOW BACK PAIN WITHOUT SCIATICA: ICD-10-CM

## 2017-07-12 DIAGNOSIS — R59.0 LYMPHADENOPATHY, MEDIASTINAL: ICD-10-CM

## 2017-07-12 DIAGNOSIS — E22.2 SYNDROME OF INAPPROPRIATE ADH (SIADH) SECRETION: ICD-10-CM

## 2017-07-12 PROCEDURE — 99214 OFFICE O/P EST MOD 30 MIN: CPT | Mod: ,,, | Performed by: INTERNAL MEDICINE

## 2017-07-12 NOTE — LETTER
July 12, 2017      Ricci Guerra MD  [address]  [facility] [place] [address]           [facility]  [address]  [address]  [place] [facility] [address]  Phone: [phone]  Fax: [phone]          Patient: Maile Light   MR Number: [id number]   YOB: 1965   Date of Visit: 7/12/2017       Dear Dr. Ricci Guerra:    Thank you for referring Maile Light to me for evaluation. Attached you will find relevant portions of my assessment and plan of care.    If you have questions, please do not hesitate to call me. I look forward to following Maile Light along with you.    Sincerely,    CHANI Briones MD    Enclosure  CC:  No Recipients    If you would like to receive this communication electronically, please contact [email or web address] or [phone] to request more information on [employer] Link access.    For providers and/or their staff who would like to refer a patient to [facility], please contact us through our one-stop-shop provider referral line, [facility] , at [phone].    If you feel you have received this communication in error or would no longer like to receive these types of communications, please e-mail [email or web address]

## 2017-07-13 NOTE — PROGRESS NOTES
[facility] History & Physical    Subjective:      Patient ID:   Maile Light  51 y.o. female  1965                                                                                 MD Steve Jennings MD      Chief Complaint:   RLL lung mass    HPI:  51 y.o. female with diagnosis of  Evaluation for elective breast reduction.  Found to have  Na 116-118.  Seen in ER, mass RLL.  SOB +, non productive cough.  No hemoptysis.  Appetite and weight stable.  Smoke 1 ppd x's 25 years.  I spoke with her about [facility], she would like a referral to the clinic.    She saw Dr. Toledo.  Bronchoscopy positve for tumor cells, likely small cell cancer, not clearly  Diagnostic.  To see Dr. Martinez for mediastinoscopy of thoracoscopy for bx and tissue dx.  To see him 7/19.    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  Try to restrict po fluids to 1,000cc daily  To help with Na management.    HTN, LBP,hernia repair x's 7, mesh in place.  She wants breast reduction.    Allergy no.  Job micah,     Extensive abdomenal surgery, colostomy reversal, Dr. Eugene Parrish,   [facility], 3 years ago.  Lumbar back surgery x's 1.    Mom Ca ? Type., paralysis.  Dad GI cancer, colostomy.  10 Br and Sist, heart dx, renal dx, MVA death.    ROS:   GEN: normal without any fever, night sweats or weight loss  HEENT: normal with no HA's, sore throat, stiff neck, changes in vision  CV: normal with no CP, SOB, PND, DE OLIVEIRA or orthopnea  PULM: See HPI.   no SOB, cough, hemoptysis, sputum or pleuritic pain  GI: See HPI.   no abdominal pain, nausea, vomiting, constipation, diarrhea, melanotic stools, BRBPR, or hematemesis  : normal with no hematuria, dysuria  BREAST: normal with no mass, discharge, pain  SKIN: normal with no rash, erythema, bruising, or swelling       Social History     Social History    Marital status:      Spouse name: N/A    Number of children: N/A    Years of  education: N/A     Occupational History    Not on file.     Social History Main Topics    Smoking status: Current Every Day Smoker     Packs/day: 0.50     Years: 25.00     Types: Cigarettes    Smokeless tobacco: Never Used    Alcohol use Yes    Drug use: No    Sexual activity: No     Other Topics Concern    Not on file     Social History Narrative    No narrative on file         Current Outpatient Prescriptions:     albuterol 90 mcg/actuation inhaler, 2 puffs every 4 hours as needed for cough, wheeze, or shortness of breath, Disp: 1 Inhaler, Rfl: 11    alprazolam (XANAX) 0.25 MG tablet, Take 1 tablet (0.25 mg total) by mouth 3 (three) times daily as needed for Anxiety., Disp: 90 tablet, Rfl: 2    fluticasone-vilanterol (BREO ELLIPTA) 200-25 mcg/dose DsDv diskus inhaler, Inhale 1 puff into the lungs once daily., Disp: 1 each, Rfl: 11    hydrocodone-acetaminophen 5-325mg (NORCO) 5-325 mg per tablet, Take 1 tablet by mouth every 4 (four) hours as needed (cough)., Disp: 150 tablet, Rfl: 0    levoFLOXacin (LEVAQUIN) 500 MG tablet, Take 1 tablet (500 mg total) by mouth once daily., Disp: 10 tablet, Rfl: 1    predniSONE (DELTASONE) 20 MG tablet, Take 2 daily for 3 days then One daily for 3 days and repeat for flare of lung symptoms as intructed, Disp: 24 tablet, Rfl: 0    promethazine (PHENERGAN) 12.5 MG Tab, Take 1 tablet (12.5 mg total) by mouth every 4 (four) hours as needed (nausea)., Disp: 60 tablet, Rfl: 0          Objective:   Vitals:  Blood pressure (!) 178/91, pulse 88, temperature 97.5 °F (36.4 °C), resp. rate 18, weight 67.3 kg (148 lb 4.8 oz).    Physical Examination:   GEN: no apparent distress, comfortable; AAOx3  HEAD: atraumatic and normocephalic  EYES: no pallor, no icterus, PERRLA  ENT: OMM, no pharyngeal erythema, external ears WNL; no nasal discharge; no thrush  NECK: no masses, thyroid normal, trachea midline, no LAD/LN's, supple  CV: RRR with no murmur; normal pulse; normal S1 and S2;  no pedal edema  CHEST: Normal respiratory effort; CTAB; normal breath sounds; no wheeze or crackles  ABDOM: nontender and nondistended; soft; normal bowel sounds; no rebound/guarding  MUSC/Skeletal: ROM normal; no crepitus; joints normal  EXTREM: no clubbing, cyanosis, inflammation or swelling, no edema or calf tenderness  SKIN: She has a sebaceous cyst on posterior R shoulder,  no rashes, lesions, ulcers, petechiae   : no sousa  NEURO: grossly intact; motor/sensory WNL; AAOx3; no tremors  PSYCH: normal mood, affect and behavior  LYMPH: normal cervical, supraclavicular, axillary and groin LN's  Breasts: NT, no palpable mass at L or R breast    I have reviewed all available lab results and radiology reports.    Na 116-118.     Radiology/Diagnostic Studies:    CAT RLL mass, ? RML nodule, increased hilar and mediastinal LN.      All lab results and imaging results have been reviewed and discussed with the patient    Assessment:   (1) 51 y.o. female with diagnosis of hyponatremia, SIADH likely.  Fluid restriction of 1,000cc daily.    (2)RLL mass and local mediastinoscopy, likely Small cell lung Cancer?      Dr. Martinez to evaluate her for mediastinoscopy or thoracoscopy for bx and tissue dx.    (3)PET shows hypermetabolic RUL mass, R mediastinal LN and L supraclavicular LN.  No distant metastases to liver, bones, brain.  MRI of brain no metastatic disease seen.    (5)RTC 2 weeks.    (6)Treatment options could include chemotherapy and radiation concurrently or sequentially.    (7)Chronic low back pain. Old MRI 8/2016 showed disc dx.           Plan:         I have explained and the patient understands all of  the current recommendation(s). I have answered all of their questions to the best of my ability and to their complete satisfaction.

## 2017-07-31 ENCOUNTER — TELEPHONE (OUTPATIENT)
Dept: PULMONOLOGY | Facility: CLINIC | Age: 52
End: 2017-07-31

## 2017-07-31 RX ORDER — CODEINE PHOSPHATE AND GUAIFENESIN 10; 100 MG/5ML; MG/5ML
10 SOLUTION ORAL EVERY 4 HOURS PRN
Qty: 473 ML | Refills: 1 | Status: SHIPPED | OUTPATIENT
Start: 2017-07-31 | End: 2017-08-14

## 2017-07-31 NOTE — TELEPHONE ENCOUNTER
----- Message from Steve Toledo MD sent at 7/31/2017  3:24 PM CDT -----  Contact: same  Could not e scribe, printed script.  ----- Message -----  From: Bonny Foote MA  Sent: 7/31/2017   1:13 PM  To: Steve Toledo MD        ----- Message -----  From: Juan Galvan  Sent: 7/31/2017  12:47 PM  To: Tre BOLIVAR Staff    Patient called in crying because she stated she cannot stop coughing and needs a refill on her Rx for her cough medicine ( Cheratussin).        [facility] [place], [place] - [address]  [address]  [place] [address]  Phone: [phone] Fax: [phone]    Patient call back number is [phone]

## 2017-08-03 ENCOUNTER — OFFICE VISIT (OUTPATIENT)
Dept: PULMONOLOGY | Facility: CLINIC | Age: 52
End: 2017-08-03
Payer: COMMERCIAL

## 2017-08-03 VITALS
OXYGEN SATURATION: 93 % | BODY MASS INDEX: 27.77 KG/M2 | SYSTOLIC BLOOD PRESSURE: 150 MMHG | HEART RATE: 98 BPM | HEIGHT: 61 IN | DIASTOLIC BLOOD PRESSURE: 86 MMHG | WEIGHT: 147.06 LBS

## 2017-08-03 DIAGNOSIS — M54.50 CHRONIC BILATERAL LOW BACK PAIN WITHOUT SCIATICA: ICD-10-CM

## 2017-08-03 DIAGNOSIS — R91.8 MASS OF UPPER LOBE OF RIGHT LUNG: ICD-10-CM

## 2017-08-03 DIAGNOSIS — G89.29 CHRONIC BILATERAL LOW BACK PAIN WITHOUT SCIATICA: ICD-10-CM

## 2017-08-03 DIAGNOSIS — R05.3 CHRONIC COUGH: ICD-10-CM

## 2017-08-03 DIAGNOSIS — L02.91 ABSCESS: Primary | ICD-10-CM

## 2017-08-03 DIAGNOSIS — R59.0 LYMPHADENOPATHY, MEDIASTINAL: ICD-10-CM

## 2017-08-03 PROCEDURE — 99999 PR PBB SHADOW E&M-EST. PATIENT-LVL III: CPT | Mod: PBBFAC,,, | Performed by: INTERNAL MEDICINE

## 2017-08-03 PROCEDURE — 3008F BODY MASS INDEX DOCD: CPT | Mod: S$GLB,,, | Performed by: INTERNAL MEDICINE

## 2017-08-03 PROCEDURE — 87070 CULTURE OTHR SPECIMN AEROBIC: CPT

## 2017-08-03 PROCEDURE — 87075 CULTR BACTERIA EXCEPT BLOOD: CPT

## 2017-08-03 PROCEDURE — 87205 SMEAR GRAM STAIN: CPT

## 2017-08-03 PROCEDURE — 99214 OFFICE O/P EST MOD 30 MIN: CPT | Mod: S$GLB,,, | Performed by: INTERNAL MEDICINE

## 2017-08-03 PROCEDURE — 87076 CULTURE ANAEROBE IDENT EACH: CPT

## 2017-08-03 RX ORDER — HYDROCODONE BITARTRATE AND ACETAMINOPHEN 5; 325 MG/1; MG/1
1 TABLET ORAL EVERY 4 HOURS PRN
Qty: 150 TABLET | Refills: 0 | Status: SHIPPED | OUTPATIENT
Start: 2017-08-03 | End: 2017-11-10

## 2017-08-03 RX ORDER — IPRATROPIUM BROMIDE AND ALBUTEROL SULFATE 2.5; .5 MG/3ML; MG/3ML
3 SOLUTION RESPIRATORY (INHALATION) EVERY 6 HOURS PRN
Qty: 120 VIAL | Refills: 11 | Status: SHIPPED | OUTPATIENT
Start: 2017-08-03 | End: 2018-08-03

## 2017-08-03 RX ORDER — SULFAMETHOXAZOLE AND TRIMETHOPRIM 800; 160 MG/1; MG/1
1 TABLET ORAL 2 TIMES DAILY
Qty: 20 TABLET | Refills: 1 | Status: ON HOLD | OUTPATIENT
Start: 2017-08-03 | End: 2017-08-09 | Stop reason: HOSPADM

## 2017-08-03 NOTE — PROGRESS NOTES
"8/3/2017    [facility]  Office f/u    Chief Complaint   Patient presents with    Follow-up     4 week    Sputum Production    COPD    Lung Cancer   aug 3, 2017-- breathing ok with inhaler, got abscess right back, spontaneous drainage last pm with stinking pus.   Having violent coughing mucous yellow.`      2017 needle bx with likely sm cell, but only cell cluster on needle, bx non diagnostic,  Had n/v 2 days ago with low grade temp.  No bowel problems or distention with n/v and good bm.    2017HPI:chronic smoker, no family history, mom  age 30, pt has had chronic cough for years, had colon surg 3 yrs ago with ventral hernias, pt had bowel obstruction.  Had problems with nocturnal wheezes and sob - no resp rx. No fh of asthma.     Pt was found to have low sodium.  cxr and ct done May 17th.  Na 116 to 118.  No bx. Done. Seen by dr Tre Briones. Needs dx.     Cough violent with emesis, no syncope        The chief compliant  problem is new to me",   PFSH:  History reviewed. No pertinent past medical history.      Past Surgical History:   Procedure Laterality Date     SECTION      COLON SURGERY      COLONOSCOPY      HERNIA MESH REMOVAL       Social History   Substance Use Topics    Smoking status: Current Every Day Smoker     Packs/day: 0.50     Years: 25.00     Types: Cigarettes    Smokeless tobacco: Never Used    Alcohol use Yes     Family History   Problem Relation Age of Onset    Cancer Mother     Cancer Father      Review of patient's allergies indicates:  No Known Allergies    Performance Status:The patient's activity level is functions out of house.      Review of Systems:  a review of eleven systems covering constitutional, Eye, HEENT, Psych, Respiratory, Cardiac, GI, , Musculoskeletal, Endocrine, Dermatologic was negative except for pertinent findings as listed ABOVE and below: all good     Exam:Comprehensive exam done. BP (!) 150/86 (BP Location: Left arm, " "Patient Position: Sitting, BP Method: Automatic)   Pulse 98   Ht 5' 1" (1.549 m)   Wt 66.7 kg (147 lb 0.8 oz)   SpO2 (!) 93%   BMI 27.78 kg/m²   Exam included Vitals as listed, and patient's appearance and affect and alertness and mood, oral exam for yeast and hygiene and pharynx lesions and Mallapatti (M) score, neck with inspection for jvd and masses and thyroid abnormalities and lymph nodes (supraclavicular and infraclavicular nodes and axillary also examined and noted if abn), chest exam included symmetry and effort and fremitus and percussion and auscultation, cardiac exam included rhythm and gallops and murmur and rubs and jvd and edema, abdominal exam for mass and hepatosplenomegaly and tenderness and hernias and bowel sounds, Musculoskeletal exam with muscle tone and posture and mobility/gait and  strength, and skin for rashes and cyanosis and pallor and turgor, extremity for clubbing.  Findings were normal except for pertinent findings listed below: violent cough resolved, M3, good exam, slight  Rhonchi right         Radiographs (ct chest and cxr) reviewed: view by direct vision  May 17  2017 mediastinal mass and rul mass and patchy left upper abn    Labs sodium 128 last wk    PFT was not done     Plan:  Clinical impression is resonably certain and repeated evaluation prn +/- follow up will be needed as below.    Maile was seen today for follow-up, sputum production, copd and lung cancer.    Diagnoses and all orders for this visit:    Abscess  -     Culture, Aerobic and Anaerobic w/gram Stain  -     sulfamethoxazole-trimethoprim 800-160mg (BACTRIM DS) 800-160 mg Tab; Take 1 tablet by mouth 2 (two) times daily.    Lymphadenopathy, mediastinal  -     hydrocodone-acetaminophen 5-325mg (NORCO) 5-325 mg per tablet; Take 1 tablet by mouth every 4 (four) hours as needed (cough).    Mass of upper lobe of right lung  -     hydrocodone-acetaminophen 5-325mg (NORCO) 5-325 mg per tablet; Take 1 tablet by " mouth every 4 (four) hours as needed (cough).    Chronic cough  -     hydrocodone-acetaminophen 5-325mg (NORCO) 5-325 mg per tablet; Take 1 tablet by mouth every 4 (four) hours as needed (cough).  -     NEBULIZER FOR HOME USE  -     albuterol-ipratropium 2.5mg-0.5mg/3mL (DUO-NEB) 0.5 mg-3 mg(2.5 mg base)/3 mL nebulizer solution; Take 3 mLs by nebulization every 6 (six) hours as needed.  -     Culture, Respiratory with Gram Stain; Standing    Chronic bilateral low back pain without sciatica        Return in about 6 weeks (around 9/14/2017), or if symptoms worsen or fail to improve.    Discussed with patient above for education the following:      Abscess on right back needs drainage.  Trim sulfa needed as surgery needed soon.      Use norco for pain as needed - low back or abcess area.    Use cough suppression as needed.    Try to arrange nebulizer to use as  Needed for cough.      addentum - #11  scaple use to open 1/2 inch opening in abscess right neck base.  Foul smelling pus drained, pus collected for culture for mrsa screen.  Bulky 4x4 guaze bandage left.

## 2017-08-03 NOTE — PATIENT INSTRUCTIONS
Abscess on right back needs drainage.  Trim sulfa needed as surgery needed soon.      Use norco for pain as needed - low back or abcess area.    Use cough suppression as needed.    Try to arrange nebulizer to use as  Needed for cough.      May use soapy water to clean abscess wound.  No occlusive dressings, allow drainage.

## 2017-08-04 ENCOUNTER — HOSPITAL ENCOUNTER (OUTPATIENT)
Dept: PREADMISSION TESTING | Facility: HOSPITAL | Age: 52
Discharge: HOME | DRG: 643 | End: 2017-08-04
Attending: THORACIC SURGERY (CARDIOTHORACIC VASCULAR SURGERY)
Payer: COMMERCIAL

## 2017-08-04 DIAGNOSIS — R91.8 MASS OF UPPER LOBE OF RIGHT LUNG: Primary | ICD-10-CM

## 2017-08-04 LAB
ANION GAP SERPL CALC-SCNC: 19 MMOL/L
BACTERIA #/AREA URNS HPF: ABNORMAL /HPF
BASOPHILS # BLD AUTO: 0.1 K/UL
BASOPHILS NFR BLD: 1 %
BILIRUB UR QL STRIP: NEGATIVE
BUN SERPL-MCNC: 6 MG/DL
CALCIUM SERPL-MCNC: 10.6 MG/DL
CHLORIDE SERPL-SCNC: 83 MMOL/L
CLARITY UR: CLEAR
CO2 SERPL-SCNC: 19 MMOL/L
COLOR UR: YELLOW
CREAT SERPL-MCNC: 0.7 MG/DL
DIFFERENTIAL METHOD: ABNORMAL
EOSINOPHIL # BLD AUTO: 0 K/UL
EOSINOPHIL NFR BLD: 0.3 %
ERYTHROCYTE [DISTWIDTH] IN BLOOD BY AUTOMATED COUNT: 13.7 %
EST. GFR  (AFRICAN AMERICAN): >60 ML/MIN/1.73 M^2
EST. GFR  (NON AFRICAN AMERICAN): >60 ML/MIN/1.73 M^2
GLUCOSE SERPL-MCNC: 91 MG/DL
GLUCOSE UR QL STRIP: NEGATIVE
GRAM STN SPEC: NORMAL
GRAM STN SPEC: NORMAL
HCT VFR BLD AUTO: 37.9 %
HGB BLD-MCNC: 12.7 G/DL
HGB UR QL STRIP: ABNORMAL
KETONES UR QL STRIP: NEGATIVE
LEUKOCYTE ESTERASE UR QL STRIP: NEGATIVE
LYMPHOCYTES # BLD AUTO: 0.8 K/UL
LYMPHOCYTES NFR BLD: 10.7 %
MCH RBC QN AUTO: 28.6 PG
MCHC RBC AUTO-ENTMCNC: 33.7 G/DL
MCV RBC AUTO: 85 FL
MICROSCOPIC COMMENT: ABNORMAL
MONOCYTES # BLD AUTO: 0.7 K/UL
MONOCYTES NFR BLD: 9.5 %
NEUTROPHILS # BLD AUTO: 6.1 K/UL
NEUTROPHILS NFR BLD: 78.5 %
NITRITE UR QL STRIP: NEGATIVE
PH UR STRIP: 7 [PH] (ref 5–8)
PLATELET # BLD AUTO: 536 K/UL
PMV BLD AUTO: 7.7 FL
POTASSIUM SERPL-SCNC: 4.5 MMOL/L
PROT UR QL STRIP: ABNORMAL
RBC # BLD AUTO: 4.46 M/UL
RBC #/AREA URNS HPF: 8 /HPF (ref 0–4)
SODIUM SERPL-SCNC: 121 MMOL/L
SP GR UR STRIP: 1.01 (ref 1–1.03)
SQUAMOUS #/AREA URNS HPF: 3 /HPF
URN SPEC COLLECT METH UR: ABNORMAL
UROBILINOGEN UR STRIP-ACNC: NEGATIVE EU/DL
WBC # BLD AUTO: 7.8 K/UL
WBC #/AREA URNS HPF: 4 /HPF (ref 0–5)

## 2017-08-04 PROCEDURE — 93010 ELECTROCARDIOGRAM REPORT: CPT | Mod: ,,, | Performed by: INTERNAL MEDICINE

## 2017-08-04 NOTE — OR NURSING
"16:13 Notified Dr. Saucedo concerning patients present . Per Dr. Saucedo please call patient to obtain if being seen by a primary care doctor. Unable to reach patient on cell phone, called spouses cell phone and he stated he would have her call the preop department.  Mrs. Light called the preop department and was very emotional and erratic in attempting to explain any details. She commented that " I have been throwing up in the [employer] parking lot". Patient also stated " Dr. Toledo lanced my boil that was going to explode all over me yesterday." Patient proceeded to cry, scream out, and was unable to communicate effectively her present needs. I attempted to inquire if the patient was going to the ER, but patient was not coherent and was screaming out.   Notified Dr. Saucedo of above, attempted to call patient and spouse and received no answer.  16:41 Per anesthesia notify Dr. Martinez of above with the concerns of the patients present status. Extensive message left on Dr. Garnica cell phone, and also notified anesthesia of inability to speak with Dr. Martinez.   Re-attempted to call Patient and was unsuccessful.       "

## 2017-08-07 ENCOUNTER — HOSPITAL ENCOUNTER (INPATIENT)
Facility: HOSPITAL | Age: 52
LOS: 2 days | Discharge: HOME | DRG: 643 | End: 2017-08-09
Attending: THORACIC SURGERY (CARDIOTHORACIC VASCULAR SURGERY) | Admitting: THORACIC SURGERY (CARDIOTHORACIC VASCULAR SURGERY)
Payer: COMMERCIAL

## 2017-08-07 ENCOUNTER — ANESTHESIA EVENT (OUTPATIENT)
Dept: SURGERY | Facility: HOSPITAL | Age: 52
DRG: 643 | End: 2017-08-07
Payer: COMMERCIAL

## 2017-08-07 DIAGNOSIS — J18.9 PNEUMONIA OF RIGHT UPPER LOBE DUE TO INFECTIOUS ORGANISM: Primary | ICD-10-CM

## 2017-08-07 DIAGNOSIS — C34.90 LUNG CANCER: ICD-10-CM

## 2017-08-07 DIAGNOSIS — F41.9 ANXIETY: ICD-10-CM

## 2017-08-07 DIAGNOSIS — R91.8 MASS OF UPPER LOBE OF RIGHT LUNG: ICD-10-CM

## 2017-08-07 DIAGNOSIS — R05.3 CHRONIC COUGH: ICD-10-CM

## 2017-08-07 DIAGNOSIS — R59.0 LYMPHADENOPATHY, MEDIASTINAL: ICD-10-CM

## 2017-08-07 DIAGNOSIS — E22.2 SIADH (SYNDROME OF INAPPROPRIATE ADH PRODUCTION): ICD-10-CM

## 2017-08-07 DIAGNOSIS — R50.9 FEVER, UNSPECIFIED FEVER CAUSE: ICD-10-CM

## 2017-08-07 DIAGNOSIS — R11.2 NAUSEA AND VOMITING, INTRACTABILITY OF VOMITING NOT SPECIFIED, UNSPECIFIED VOMITING TYPE: ICD-10-CM

## 2017-08-07 DIAGNOSIS — R91.8 MASS OF LUNG: ICD-10-CM

## 2017-08-07 LAB
ALBUMIN SERPL BCP-MCNC: 2.6 G/DL
ALBUMIN SERPL BCP-MCNC: 2.6 G/DL
ALP SERPL-CCNC: 121 U/L
ALT SERPL W/O P-5'-P-CCNC: 28 U/L
ANION GAP SERPL CALC-SCNC: 12 MMOL/L
ANION GAP SERPL CALC-SCNC: 14 MMOL/L
AST SERPL-CCNC: 26 U/L
BACTERIA #/AREA URNS HPF: NORMAL /HPF
BACTERIA SPEC AEROBE CULT: NO GROWTH
BACTERIA SPEC ANAEROBE CULT: NORMAL
BASOPHILS # BLD AUTO: 0 K/UL
BASOPHILS NFR BLD: 0.3 %
BILIRUB SERPL-MCNC: 0.3 MG/DL
BILIRUB UR QL STRIP: NEGATIVE
BUN SERPL-MCNC: 7 MG/DL
BUN SERPL-MCNC: 8 MG/DL
CALCIUM SERPL-MCNC: 9.7 MG/DL
CALCIUM SERPL-MCNC: 9.8 MG/DL
CHLORIDE SERPL-SCNC: 90 MMOL/L
CHLORIDE SERPL-SCNC: 92 MMOL/L
CLARITY UR: CLEAR
CO2 SERPL-SCNC: 19 MMOL/L
CO2 SERPL-SCNC: 24 MMOL/L
COLOR UR: YELLOW
CREAT SERPL-MCNC: 0.7 MG/DL
CREAT SERPL-MCNC: 0.8 MG/DL
DIFFERENTIAL METHOD: ABNORMAL
EOSINOPHIL # BLD AUTO: 0 K/UL
EOSINOPHIL NFR BLD: 0.3 %
ERYTHROCYTE [DISTWIDTH] IN BLOOD BY AUTOMATED COUNT: 13.7 %
EST. GFR  (AFRICAN AMERICAN): >60 ML/MIN/1.73 M^2
EST. GFR  (AFRICAN AMERICAN): >60 ML/MIN/1.73 M^2
EST. GFR  (NON AFRICAN AMERICAN): >60 ML/MIN/1.73 M^2
EST. GFR  (NON AFRICAN AMERICAN): >60 ML/MIN/1.73 M^2
GLUCOSE SERPL-MCNC: 116 MG/DL
GLUCOSE SERPL-MCNC: 127 MG/DL
GLUCOSE UR QL STRIP: NEGATIVE
HCT VFR BLD AUTO: 31.7 %
HGB BLD-MCNC: 10.9 G/DL
HGB UR QL STRIP: ABNORMAL
KETONES UR QL STRIP: NEGATIVE
LEUKOCYTE ESTERASE UR QL STRIP: NEGATIVE
LYMPHOCYTES # BLD AUTO: 0.7 K/UL
LYMPHOCYTES NFR BLD: 11.1 %
MCH RBC QN AUTO: 28.7 PG
MCHC RBC AUTO-ENTMCNC: 34.2 G/DL
MCV RBC AUTO: 84 FL
MICROSCOPIC COMMENT: NORMAL
MONOCYTES # BLD AUTO: 0.7 K/UL
MONOCYTES NFR BLD: 10.1 %
NEUTROPHILS # BLD AUTO: 5.2 K/UL
NEUTROPHILS NFR BLD: 78.2 %
NITRITE UR QL STRIP: NEGATIVE
OSMOLALITY UR: 491 MOSM/KG
PH UR STRIP: 6 [PH] (ref 5–8)
PHOSPHATE SERPL-MCNC: 4 MG/DL
PLATELET # BLD AUTO: 626 K/UL
PMV BLD AUTO: 6.8 FL
POTASSIUM SERPL-SCNC: 4.5 MMOL/L
POTASSIUM SERPL-SCNC: 4.5 MMOL/L
PROT SERPL-MCNC: 7.5 G/DL
PROT UR QL STRIP: NEGATIVE
RBC # BLD AUTO: 3.78 M/UL
RBC #/AREA URNS HPF: 4 /HPF (ref 0–4)
SODIUM SERPL-SCNC: 123 MMOL/L
SODIUM SERPL-SCNC: 128 MMOL/L
SP GR UR STRIP: 1.01 (ref 1–1.03)
SQUAMOUS #/AREA URNS HPF: 3 /HPF
TSH SERPL DL<=0.005 MIU/L-ACNC: 0.74 UIU/ML
URATE SERPL-MCNC: 2.4 MG/DL
URN SPEC COLLECT METH UR: ABNORMAL
UROBILINOGEN UR STRIP-ACNC: NEGATIVE EU/DL
WBC # BLD AUTO: 6.6 K/UL
WBC #/AREA URNS HPF: 1 /HPF (ref 0–5)

## 2017-08-07 PROCEDURE — 99223 1ST HOSP IP/OBS HIGH 75: CPT | Mod: ,,, | Performed by: INTERNAL MEDICINE

## 2017-08-07 PROCEDURE — 25000003 PHARM REV CODE 250: Performed by: INTERNAL MEDICINE

## 2017-08-07 PROCEDURE — 36415 COLL VENOUS BLD VENIPUNCTURE: CPT

## 2017-08-07 PROCEDURE — 84443 ASSAY THYROID STIM HORMONE: CPT

## 2017-08-07 PROCEDURE — 87086 URINE CULTURE/COLONY COUNT: CPT

## 2017-08-07 PROCEDURE — 25000003 PHARM REV CODE 250: Performed by: ANESTHESIOLOGY

## 2017-08-07 PROCEDURE — 11000001 HC ACUTE MED/SURG PRIVATE ROOM

## 2017-08-07 PROCEDURE — 80053 COMPREHEN METABOLIC PANEL: CPT

## 2017-08-07 PROCEDURE — 85025 COMPLETE CBC W/AUTO DIFF WBC: CPT

## 2017-08-07 PROCEDURE — 81000 URINALYSIS NONAUTO W/SCOPE: CPT

## 2017-08-07 PROCEDURE — 99900035 HC TECH TIME PER 15 MIN (STAT)

## 2017-08-07 PROCEDURE — 83935 ASSAY OF URINE OSMOLALITY: CPT

## 2017-08-07 PROCEDURE — 63600175 PHARM REV CODE 636 W HCPCS: Performed by: INTERNAL MEDICINE

## 2017-08-07 PROCEDURE — 84550 ASSAY OF BLOOD/URIC ACID: CPT

## 2017-08-07 PROCEDURE — 80069 RENAL FUNCTION PANEL: CPT

## 2017-08-07 RX ORDER — GUAIFENESIN/DEXTROMETHORPHAN 100-10MG/5
5 SYRUP ORAL EVERY 4 HOURS PRN
Status: DISCONTINUED | OUTPATIENT
Start: 2017-08-07 | End: 2017-08-09 | Stop reason: HOSPADM

## 2017-08-07 RX ORDER — HYDROCODONE BITARTRATE AND ACETAMINOPHEN 10; 325 MG/1; MG/1
1 TABLET ORAL EVERY 6 HOURS PRN
Status: DISCONTINUED | OUTPATIENT
Start: 2017-08-07 | End: 2017-08-07

## 2017-08-07 RX ORDER — FLUTICASONE FUROATE AND VILANTEROL 200; 25 UG/1; UG/1
1 POWDER RESPIRATORY (INHALATION) DAILY
Status: DISCONTINUED | OUTPATIENT
Start: 2017-08-07 | End: 2017-08-07 | Stop reason: SDUPTHER

## 2017-08-07 RX ORDER — ZOLPIDEM TARTRATE 5 MG/1
5 TABLET ORAL NIGHTLY PRN
Status: DISCONTINUED | OUTPATIENT
Start: 2017-08-07 | End: 2017-08-09 | Stop reason: HOSPADM

## 2017-08-07 RX ORDER — LIDOCAINE HYDROCHLORIDE 10 MG/ML
1 INJECTION, SOLUTION EPIDURAL; INFILTRATION; INTRACAUDAL; PERINEURAL ONCE
Status: COMPLETED | OUTPATIENT
Start: 2017-08-07 | End: 2017-08-07

## 2017-08-07 RX ORDER — IPRATROPIUM BROMIDE AND ALBUTEROL SULFATE 2.5; .5 MG/3ML; MG/3ML
3 SOLUTION RESPIRATORY (INHALATION) EVERY 6 HOURS PRN
Status: DISCONTINUED | OUTPATIENT
Start: 2017-08-07 | End: 2017-08-09 | Stop reason: HOSPADM

## 2017-08-07 RX ORDER — ALPRAZOLAM 0.25 MG/1
0.25 TABLET ORAL 3 TIMES DAILY PRN
Status: DISCONTINUED | OUTPATIENT
Start: 2017-08-07 | End: 2017-08-08

## 2017-08-07 RX ORDER — HYDROCODONE BITARTRATE AND ACETAMINOPHEN 5; 325 MG/1; MG/1
1 TABLET ORAL EVERY 4 HOURS PRN
Status: DISCONTINUED | OUTPATIENT
Start: 2017-08-07 | End: 2017-08-07

## 2017-08-07 RX ORDER — FLUTICASONE FUROATE AND VILANTEROL 200; 25 UG/1; UG/1
1 POWDER RESPIRATORY (INHALATION) DAILY
Status: DISCONTINUED | OUTPATIENT
Start: 2017-08-07 | End: 2017-08-09 | Stop reason: HOSPADM

## 2017-08-07 RX ORDER — SODIUM CHLORIDE, SODIUM LACTATE, POTASSIUM CHLORIDE, CALCIUM CHLORIDE 600; 310; 30; 20 MG/100ML; MG/100ML; MG/100ML; MG/100ML
INJECTION, SOLUTION INTRAVENOUS CONTINUOUS
Status: DISCONTINUED | OUTPATIENT
Start: 2017-08-07 | End: 2017-08-07

## 2017-08-07 RX ORDER — IPRATROPIUM BROMIDE AND ALBUTEROL SULFATE 2.5; .5 MG/3ML; MG/3ML
3 SOLUTION RESPIRATORY (INHALATION) EVERY 6 HOURS PRN
Status: DISCONTINUED | OUTPATIENT
Start: 2017-08-07 | End: 2017-08-07 | Stop reason: SDUPTHER

## 2017-08-07 RX ORDER — HYDROCODONE BITARTRATE AND ACETAMINOPHEN 5; 325 MG/1; MG/1
1 TABLET ORAL ONCE
Status: COMPLETED | OUTPATIENT
Start: 2017-08-07 | End: 2017-08-07

## 2017-08-07 RX ORDER — OXYCODONE AND ACETAMINOPHEN 10; 325 MG/1; MG/1
1 TABLET ORAL EVERY 6 HOURS PRN
Status: DISCONTINUED | OUTPATIENT
Start: 2017-08-07 | End: 2017-08-09 | Stop reason: HOSPADM

## 2017-08-07 RX ORDER — PANTOPRAZOLE SODIUM 40 MG/1
40 TABLET, DELAYED RELEASE ORAL DAILY
Status: DISCONTINUED | OUTPATIENT
Start: 2017-08-07 | End: 2017-08-09 | Stop reason: HOSPADM

## 2017-08-07 RX ORDER — MOXIFLOXACIN HYDROCHLORIDE 400 MG/250ML
400 INJECTION, SOLUTION INTRAVENOUS
Status: DISCONTINUED | OUTPATIENT
Start: 2017-08-07 | End: 2017-08-09 | Stop reason: HOSPADM

## 2017-08-07 RX ORDER — CODEINE PHOSPHATE AND GUAIFENESIN 10; 100 MG/5ML; MG/5ML
10 SOLUTION ORAL EVERY 4 HOURS PRN
Status: DISCONTINUED | OUTPATIENT
Start: 2017-08-07 | End: 2017-08-07 | Stop reason: ALTCHOICE

## 2017-08-07 RX ORDER — TOLVAPTAN 15 MG/1
15 TABLET ORAL ONCE
Status: COMPLETED | OUTPATIENT
Start: 2017-08-07 | End: 2017-08-07

## 2017-08-07 RX ADMIN — ALPRAZOLAM 0.25 MG: 0.25 TABLET ORAL at 08:08

## 2017-08-07 RX ADMIN — HYDROCODONE BITARTRATE AND ACETAMINOPHEN 1 TABLET: 5; 325 TABLET ORAL at 11:08

## 2017-08-07 RX ADMIN — LIDOCAINE HYDROCHLORIDE 10 MG: 10 INJECTION, SOLUTION EPIDURAL; INFILTRATION; INTRACAUDAL; PERINEURAL at 06:08

## 2017-08-07 RX ADMIN — OXYCODONE AND ACETAMINOPHEN 1 TABLET: 10; 325 TABLET ORAL at 08:08

## 2017-08-07 RX ADMIN — HYDROCODONE BITARTRATE AND ACETAMINOPHEN 1 TABLET: 10; 325 TABLET ORAL at 03:08

## 2017-08-07 RX ADMIN — ALPRAZOLAM 0.25 MG: 0.25 TABLET ORAL at 11:08

## 2017-08-07 RX ADMIN — HYDROCODONE BITARTRATE AND ACETAMINOPHEN 1 TABLET: 5; 325 TABLET ORAL at 08:08

## 2017-08-07 RX ADMIN — SODIUM CHLORIDE, SODIUM LACTATE, POTASSIUM CHLORIDE, AND CALCIUM CHLORIDE: .6; .31; .03; .02 INJECTION, SOLUTION INTRAVENOUS at 06:08

## 2017-08-07 RX ADMIN — HYDROCODONE BITARTATE AND ACETAMINOPHEN 1 TABLET: 5; 325 TABLET ORAL at 11:08

## 2017-08-07 RX ADMIN — TOLVAPTAN 15 MG: 15 TABLET ORAL at 03:08

## 2017-08-07 RX ADMIN — PANTOPRAZOLE SODIUM 40 MG: 40 TABLET, DELAYED RELEASE ORAL at 11:08

## 2017-08-07 RX ADMIN — MOXIFLOXACIN HYDROCHLORIDE 400 MG: 400 INJECTION, SOLUTION INTRAVENOUS at 07:08

## 2017-08-07 NOTE — NURSING
Pt crying in pain to right side.  She states she takes 2 hydrocodone every 4 hours at home.  Home meds for admit corrected.  Michelle with Dr Simpson notified.  1 time dose ordered per Dr simpson and he will review home meds while making rounds.

## 2017-08-07 NOTE — OR NURSING
Received orders for admit to PCU, patient and spouse present. All valuables per spouse.House supervisor and Dr. Simpson aware of admit

## 2017-08-07 NOTE — OR NURSING
Patient transferred to room 223 via wheelchair, spouse at side and valuables removed. IV heplock and intact

## 2017-08-07 NOTE — CONSULTS
INPATIENT NEPHROLOGY CONSULT   [facility] [place] NEPHROLOGY    Patient Name: Maile Light  Date: 08/07/2017    Reason for consultation: Hyponatremia    Chief Complaint: Admitted for bronchoscopy.     History of Present Illness:  Ms. Light is a 50 y/o F with newly diagnosed SCLC who p/w bronchoscopy for another tissue sample. She says that she was diagnosed about 3-4 weeks ago with lung cancer after outpatient labs showed hyponatremia. Her records show on 7/3- Na 128, 7/11- Na 123, and 8/4- Na 121. Today, her serum Na is 123 so we have been consulted for management. She reports severe R sided chest/rib pain radiating to her back. She denies n/v. She drinks 1 large gatorade per day along with some water with her meds. She is not on any diuretics at home. She denies fevers, URI symptoms, dyspnea, abd pain, diarrhea or decreased urination. She denies any exacerbating/relieving factors for her pain.     CXR today shows right paratracheal and right hilar masses and interval development of alveolar opacity in the right upper lobe consistent with pneumonia, possibly post obstructive in nature.    Plan of Care:    Assessment:  Hyponatremia, suspect SIADH 2/2 SCLC + PNA  Metabolic acidosis  Anemia  Hematuria    Plan:    1. Hyponatremia- Most likely SIADH 2/2 pulm process + pain. She does not appear clinically volume down, and this is supported by low uric acid level. Will institute a 1L fluid restriction. Ordered renal panel for 8pm. If no improvement with fluid restriction over the next 24 hours, will start salt tabs. She is getting a bronch today- defer initiation of antbx for ? post obstructive PNA to pulm/hospitalist. She is neurologically intact- no acute indication for hypertonic saline.     UPDATE: Spoke to Dr. Simpson- patient was supposed to get surgery today but put on hold due to low Na. Will give tolvaptan 15mg x 1 and f/u labs in AM. Change to 1.5L fluid restriction.     2. Acidosis- Mild- reassess in AM- no current  indication for oral bicarb.    3. Anemia- She is becoming more anemic- will trend.    4. Microscopic Hematuria- Noted on prior 2 urine samples- there is no proteinuria. Awaiting urine culture. May warrant workup for kidney stones.     Thank you for allowing us to participate in this patient's care. We will continue to follow.    Vital Signs:  Temp Readings from Last 3 Encounters:   17 98.2 °F (36.8 °C) (Oral)   17 97.5 °F (36.4 °C)   17 97.9 °F (36.6 °C) (Oral)     Pulse Readings from Last 3 Encounters:   17 98   17 98   17 88     BP Readings from Last 3 Encounters:   17 125/66   17 (!) 150/86   17 (!) 178/91     Weight:  Wt Readings from Last 3 Encounters:   17 65.8 kg (145 lb)   17 66.7 kg (147 lb 0.8 oz)   17 67.3 kg (148 lb 4.8 oz)     Past Medical & Surgical History:  Past Medical History:   Diagnosis Date    Arthritis     Cancer     LUNG 7-17    Wears glasses      Past Surgical History:   Procedure Laterality Date    BTL       SECTION      COLON SURGERY      COLONOSCOPY      HERNIA MESH REMOVAL      HERNIA REPAIR      X 7 ABD     Past Social History:  Social History     Social History    Marital status:      Spouse name: N/A    Number of children: N/A    Years of education: N/A     Social History Main Topics    Smoking status: Current Every Day Smoker     Packs/day: 0.25     Years: 25.00     Types: Cigarettes    Smokeless tobacco: Never Used    Alcohol use Yes      Comment: social    Drug use: No    Sexual activity: No     Other Topics Concern    None     Social History Narrative    None     Medications:  No current facility-administered medications on file prior to encounter.      Current Outpatient Prescriptions on File Prior to Encounter   Medication Sig Dispense Refill    albuterol 90 mcg/actuation inhaler 2 puffs every 4 hours as needed for cough, wheeze, or shortness of breath 1 Inhaler 11     alprazolam (XANAX) 0.25 MG tablet Take 1 tablet (0.25 mg total) by mouth 3 (three) times daily as needed for Anxiety. 90 tablet 2    fluticasone-vilanterol (BREO ELLIPTA) 200-25 mcg/dose DsDv diskus inhaler Inhale 1 puff into the lungs once daily. 1 each 11    guaifenesin-codeine 100-10 mg/5 ml (TUSSI-ORGANIDIN NR)  mg/5 mL syrup Take 10 mLs by mouth every 4 (four) hours as needed for Cough. 473 mL 1    levoFLOXacin (LEVAQUIN) 500 MG tablet Take 1 tablet (500 mg total) by mouth once daily. 10 tablet 1    predniSONE (DELTASONE) 20 MG tablet Take 2 daily for 3 days then One daily for 3 days and repeat for flare of lung symptoms as intructed 24 tablet 0    promethazine (PHENERGAN) 12.5 MG Tab Take 1 tablet (12.5 mg total) by mouth every 4 (four) hours as needed (nausea). 60 tablet 0     Scheduled Meds:   fluticasone-vilanterol  1 puff Inhalation Daily    pantoprazole  40 mg Oral Daily     Continuous Infusions:   lactated Ringers Stopped (08/07/17 0930)     PRN Meds:.albuterol-ipratropium 2.5mg-0.5mg/3mL, alprazolam, dextromethorphan-guaifenesin  mg/5 ml, hydrocodone-acetaminophen 5-325mg    Allergies:  Review of patient's allergies indicates no known allergies.    Past Family History:  Reviewed; refer to Hospitalist Admission Note    Review of Systems:  Review of Systems - All 14 systems reviewed and negative, except as noted in HPI    Physical Exam:  /66 (BP Location: Left arm, Patient Position: Sitting, BP Method: Automatic)   Pulse 98   Temp 98.2 °F (36.8 °C) (Oral)   Resp 20   Ht 5' (1.524 m)   Wt 65.8 kg (145 lb)   SpO2 97%   Breastfeeding? No   BMI 28.32 kg/m²     General Appearance:    NAD, AAO x 3, cooperative, appears stated age   Head:    Normocephalic, atraumatic   Eyes:    PER, EOMI, and conjunctiva/sclera clear bilaterally        Throat:   Moist mucus membranes   Lungs:     Clear to auscultation bilaterally, no wheeze, crackles, rales      or rhonchi, symmetric air  movement, respirations unlabored   Heart:    Regular rate and rhythm, S1 and S2 normal, no murmur, rub   or gallop   Abdomen:     Soft, non-tender, non-distended, bowel sounds active all four   quadrants, no RT or guarding, no masses, no organomegaly   Extremities:   Warm and well perfused, distal pulses intact, no cyanosis or    peripheral edema   MSK:   No joint or muscle swelling, tenderness or deformity   Skin:   Skin color, texture, turgor normal, no rashes or lesions   Neurologic/Psychiatric:   CNII-XII intact, normal strength and sensation       throughout, no asterixis; tearful/anxious affect     Results:  Lab Results   Component Value Date     (L) 08/07/2017    K 4.5 08/07/2017    CL 90 (L) 08/07/2017    CO2 19 (L) 08/07/2017    BUN 7 08/07/2017    CREATININE 0.7 08/07/2017    CALCIUM 9.7 08/07/2017    ANIONGAP 14 08/07/2017    ESTGFRAFRICA >60 08/07/2017    EGFRNONAA >60 08/07/2017     Lab Results   Component Value Date    CALCIUM 9.7 08/07/2017       Recent Labs  Lab 08/07/17  1025   WBC 6.60   RBC 3.78*   HGB 10.9*   HCT 31.7*   *   MCV 84   MCH 28.7   MCHC 34.2     I have personally reviewed pertinent radiological imaging and reports.    Germaine Luna MD MPH  [facility]  [address] [place] [id number]  [phone] (p)  [phone] (f)

## 2017-08-07 NOTE — NURSING
Received report from Celine in pre op.  Pt arrived in room, vs obtained and tele monitor placed.  Pt crying, complaining of pain to right side.  Looking for orders, none seen yet.

## 2017-08-07 NOTE — PLAN OF CARE
Problem: Patient Care Overview  Goal: Plan of Care Review  Outcome: Ongoing (interventions implemented as appropriate)  Pt very anxious, crying all day, seems to be extremely depressed.  She was suppose to have thoracotomy today but it was postponed because of low NA level of 120.  Dr Luna consulted and meds to increase NA were given.  Plans for surgery tomorrow if NA in improved.

## 2017-08-07 NOTE — H&P
"PCP: Ricci Guerra MD    History & Physical    Chief Complaint: "Low sodium level"    History of Present Illness:  Patient is a 51 y.o. female admitted to Hospitalist Service from Pre-op at directions by Dr. Martinez with complaint of "Low sodium level". Patient reportedly has past medical history significant for lung mass, chronic cough, nicotine addiction and chronic hyponatremia for 2 months. Patient is under care of Dr. Toledo. Patient was scheduled for mediastinoscopy today but it was postponed due to hyponatremia. Patient appears anxious and emotionally labile. Her records show on 7/3- Na 128, - Na 123, and - Na 121. Patient also reported frequent cough, dry, mostly in the right markel-chest. Denied excessive PO fluid intake. Patient denied chest pain, shortness of breath, abdominal pain, nausea, vomiting, headache, vision changes, focal neuro-deficits or fever.    Past Medical History:   Diagnosis Date    Arthritis     Cancer     LUNG 7-17    Wears glasses      Past Surgical History:   Procedure Laterality Date    BTL       SECTION      COLON SURGERY      COLONOSCOPY      HERNIA MESH REMOVAL      HERNIA REPAIR      X 7 ABD     Family History   Problem Relation Age of Onset    Cancer Mother     Cancer Father      Social History   Substance Use Topics    Smoking status: Current Every Day Smoker     Packs/day: 0.25     Years: 25.00     Types: Cigarettes    Smokeless tobacco: Never Used    Alcohol use Yes      Comment: social      Review of patient's allergies indicates:  No Known Allergies  PTA Medications   Medication Sig    albuterol 90 mcg/actuation inhaler 2 puffs every 4 hours as needed for cough, wheeze, or shortness of breath    albuterol-ipratropium 2.5mg-0.5mg/3mL (DUO-NEB) 0.5 mg-3 mg(2.5 mg base)/3 mL nebulizer solution Take 3 mLs by nebulization every 6 (six) hours as needed.    alprazolam (XANAX) 0.25 MG tablet Take 1 tablet (0.25 mg total) by mouth 3 (three) times " daily as needed for Anxiety.    fluticasone-vilanterol (BREO ELLIPTA) 200-25 mcg/dose DsDv diskus inhaler Inhale 1 puff into the lungs once daily.    guaifenesin-codeine 100-10 mg/5 ml (TUSSI-ORGANIDIN NR)  mg/5 mL syrup Take 10 mLs by mouth every 4 (four) hours as needed for Cough.    hydrocodone-acetaminophen 5-325mg (NORCO) 5-325 mg per tablet Take 1 tablet by mouth every 4 (four) hours as needed (cough).    levoFLOXacin (LEVAQUIN) 500 MG tablet Take 1 tablet (500 mg total) by mouth once daily.    predniSONE (DELTASONE) 20 MG tablet Take 2 daily for 3 days then One daily for 3 days and repeat for flare of lung symptoms as intructed    sulfamethoxazole-trimethoprim 800-160mg (BACTRIM DS) 800-160 mg Tab Take 1 tablet by mouth 2 (two) times daily.    promethazine (PHENERGAN) 12.5 MG Tab Take 1 tablet (12.5 mg total) by mouth every 4 (four) hours as needed (nausea).     Review of Systems:  Constitutional: no fever or chills  Eyes: no visual changes  Ears, nose, mouth, throat, and face: no nasal congestion or sore throat  Respiratory: see HPI  Cardiovascular: no chest pain or palpitations  Gastrointestinal: no nausea or vomiting, no abdominal pain or change in bowel habits  Genitourinary: no hematuria or dysuria  Integument/breast: no rash or pruritis  Hematologic/lymphatic: no easy bruising or lymphadenopathy  Musculoskeletal: no arthralgias or myalgias  Neurological: no seizures or tremors.  Behavioral/Psych: no auditory or visual hallucinations. + Anxious  And possibly depressed  Endocrine: no heat or cold intolerance     OBJECTIVE:     Vital Signs (Most Recent)  Temp: 97.9 °F (36.6 °C) (08/07/17 0557)  Pulse: (!) 115 (08/07/17 0557)  BP: 131/77 (08/07/17 0557)  SpO2: 100 % (08/07/17 0557)    Physical Exam:  General appearance: well developed, appears stated age  Head: normocephalic, atraumatic  Eyes:  conjunctivae/corneas clear. PERRL.  Nose: Nares normal. Septum midline.  Throat: lips, mucosa, and  tongue normal; teeth and gums normal, no throat erythema.  Neck: supple, symmetrical, trachea midline, no JVD and thyroid not enlarged, symmetric, no tenderness/mass/nodules  Lungs:  clear to auscultation bilaterally and normal respiratory effort  Chest wall: no tenderness  Heart: regular rate and rhythm, S1, S2 normal, no murmur, click, rub or gallop  Abdomen: soft, non-tender non-distented; bowel sounds normal; no masses,  no organomegaly  Extremities: no cyanosis, clubbing or edema.   Pulses: 2+ and symmetric  Skin: Skin color, texture, turgor normal. No rashes or lesions.  Lymph nodes: Cervical, supraclavicular, and axillary nodes normal.  Neurologic: Normal strength and tone. No focal numbness or weakness. CNII-XII intact.      Laboratory:   CBC:   Recent Labs  Lab 08/07/17  1025   WBC 6.60   RBC 3.78*   HGB 10.9*   HCT 31.7*   *   MCV 84   MCH 28.7   MCHC 34.2     CMP:   Recent Labs  Lab 08/07/17  1025   *   CALCIUM 9.7   ALBUMIN 2.6*   PROT 7.5   *   K 4.5   CO2 19*   CL 90*   BUN 7   CREATININE 0.7   ALKPHOS 121   ALT 28   AST 26   BILITOT 0.3     Microbiology Results (last 7 days)     Procedure Component Value Units Date/Time    Urine culture [[id number]] Collected:  08/07/17 1042    Order Status:  Sent Specimen:  Urine from Urine, Clean Catch Updated:  08/07/17 1043          Recent Labs  Lab 08/07/17  1042   COLORU Yellow   SPECGRAV 1.015   PHUR 6.0   PROTEINUA Negative   BACTERIA Occasional   NITRITE Negative   LEUKOCYTESUR Negative   UROBILINOGEN Negative       No results found for: HGBA1C  Microbiology Results (last 7 days)     ** No results found for the last 168 hours. **        Diagnostic Results:  Chest X-Ray: 1.  Right paratracheal and right hilar masses are again noted for which a malignant etiology is possible. If this patient has not undergone prior CT, contrast enhanced CT is recommended.  2. Interval development of alveolar opacity in the right upper lobe consistent with  pneumonia, possibly post obstructive in nature.    Assessment/Plan:     Active Hospital Problems    Diagnosis  POA    *SIADH (syndrome of inappropriate ADH production) [E22.2]  Case discussed with Dr. Martinez and Dr. Luna.  Samsca 15 mg po now.  Follow BMP.  NPO after MN, if serum sodium better proceed with mediastinoscopy in AM.  Check TSH and urine and serum osmolality.    Yes    Anxiety [F41.9]  Chronic problem. Will continue chronic medications and monitor for any changes, adjusting as needed.    Yes    Pneumonia - post-obstructive [J18.9]  Supplemental O2 via nasal canula; titrate O2 saturation to >92%.   Consult Dr. Toledo in AM.  Continue beta 2 agonist bronchodilator treatments.   Continue IV antibiotics - Zosyn and Avelox (avoid hypotonic infusions).  Check sputum GS and Cx.   Continue routine medications as before.     Yes    Chronic cough [R05]  Cough suppressants and PO Oxycodone.    Yes    Smoker [F17.200]  Chronic problem. Will continue chronic medications and monitor for any changes, adjusting as needed.    Yes    Mass of upper lobe of right lung [R91.8]  Yes    Lymphadenopathy, mediastinal [R59.0]  NPO after MN, if serum sodium better proceed with mediastinoscopy in AM.  Yes      Discussed with patient's , answered all the questions.              VTE Risk Mitigation         Ordered     Place SUE hose  Until discontinued  - Avoiding anticoagulation due to thoracic procedure in AM. Would need Lovenox post procedure for DVT prophyalxis.     08/07/17 1011        Sylvia Simpson MD  Department of Hospital Medicine   [facility]

## 2017-08-08 ENCOUNTER — ANESTHESIA (OUTPATIENT)
Dept: SURGERY | Facility: HOSPITAL | Age: 52
DRG: 643 | End: 2017-08-08
Payer: COMMERCIAL

## 2017-08-08 LAB
ALBUMIN SERPL BCP-MCNC: 2.7 G/DL
ALP SERPL-CCNC: 120 U/L
ALT SERPL W/O P-5'-P-CCNC: 30 U/L
ANION GAP SERPL CALC-SCNC: 15 MMOL/L
AST SERPL-CCNC: 24 U/L
BASOPHILS # BLD AUTO: 0 K/UL
BASOPHILS NFR BLD: 0.1 %
BILIRUB SERPL-MCNC: 0.4 MG/DL
BUN SERPL-MCNC: 8 MG/DL
CALCIUM SERPL-MCNC: 9.9 MG/DL
CHLORIDE SERPL-SCNC: 93 MMOL/L
CO2 SERPL-SCNC: 22 MMOL/L
CREAT SERPL-MCNC: 0.8 MG/DL
DIFFERENTIAL METHOD: ABNORMAL
EOSINOPHIL # BLD AUTO: 0 K/UL
EOSINOPHIL NFR BLD: 0.2 %
ERYTHROCYTE [DISTWIDTH] IN BLOOD BY AUTOMATED COUNT: 13.8 %
EST. GFR  (AFRICAN AMERICAN): >60 ML/MIN/1.73 M^2
EST. GFR  (NON AFRICAN AMERICAN): >60 ML/MIN/1.73 M^2
GLUCOSE SERPL-MCNC: 109 MG/DL
HCT VFR BLD AUTO: 33.4 %
HGB BLD-MCNC: 11.3 G/DL
LYMPHOCYTES # BLD AUTO: 0.9 K/UL
LYMPHOCYTES NFR BLD: 10 %
MCH RBC QN AUTO: 28.7 PG
MCHC RBC AUTO-ENTMCNC: 33.7 G/DL
MCV RBC AUTO: 85 FL
MONOCYTES # BLD AUTO: 1 K/UL
MONOCYTES NFR BLD: 11.2 %
NEUTROPHILS # BLD AUTO: 6.8 K/UL
NEUTROPHILS NFR BLD: 78.5 %
PLATELET # BLD AUTO: 620 K/UL
PMV BLD AUTO: 7 FL
POTASSIUM SERPL-SCNC: 4.7 MMOL/L
PROT SERPL-MCNC: 7.8 G/DL
RBC # BLD AUTO: 3.93 M/UL
SODIUM SERPL-SCNC: 130 MMOL/L
WBC # BLD AUTO: 8.7 K/UL

## 2017-08-08 PROCEDURE — 71000039 HC RECOVERY, EACH ADD'L HOUR: Performed by: THORACIC SURGERY (CARDIOTHORACIC VASCULAR SURGERY)

## 2017-08-08 PROCEDURE — 63600175 PHARM REV CODE 636 W HCPCS: Performed by: NURSE ANESTHETIST, CERTIFIED REGISTERED

## 2017-08-08 PROCEDURE — 27201423 OPTIME MED/SURG SUP & DEVICES STERILE SUPPLY: Performed by: THORACIC SURGERY (CARDIOTHORACIC VASCULAR SURGERY)

## 2017-08-08 PROCEDURE — D9220A PRA ANESTHESIA: Mod: ANES,,, | Performed by: ANESTHESIOLOGY

## 2017-08-08 PROCEDURE — 88307 TISSUE EXAM BY PATHOLOGIST: CPT | Performed by: PATHOLOGY

## 2017-08-08 PROCEDURE — 99232 SBSQ HOSP IP/OBS MODERATE 35: CPT | Mod: ,,, | Performed by: INTERNAL MEDICINE

## 2017-08-08 PROCEDURE — 36000710: Performed by: THORACIC SURGERY (CARDIOTHORACIC VASCULAR SURGERY)

## 2017-08-08 PROCEDURE — 99900104 DSU ONLY-NO CHARGE-EA ADD'L HR (STAT): Performed by: THORACIC SURGERY (CARDIOTHORACIC VASCULAR SURGERY)

## 2017-08-08 PROCEDURE — 25000003 PHARM REV CODE 250: Performed by: INTERNAL MEDICINE

## 2017-08-08 PROCEDURE — 37000009 HC ANESTHESIA EA ADD 15 MINS: Performed by: THORACIC SURGERY (CARDIOTHORACIC VASCULAR SURGERY)

## 2017-08-08 PROCEDURE — 85025 COMPLETE CBC W/AUTO DIFF WBC: CPT

## 2017-08-08 PROCEDURE — 11000001 HC ACUTE MED/SURG PRIVATE ROOM

## 2017-08-08 PROCEDURE — 99222 1ST HOSP IP/OBS MODERATE 55: CPT | Mod: ,,, | Performed by: INTERNAL MEDICINE

## 2017-08-08 PROCEDURE — 88342 IMHCHEM/IMCYTCHM 1ST ANTB: CPT | Mod: 26,,, | Performed by: PATHOLOGY

## 2017-08-08 PROCEDURE — 63600175 PHARM REV CODE 636 W HCPCS: Performed by: INTERNAL MEDICINE

## 2017-08-08 PROCEDURE — D9220A PRA ANESTHESIA: Mod: CRNA,,, | Performed by: NURSE ANESTHETIST, CERTIFIED REGISTERED

## 2017-08-08 PROCEDURE — 25000003 PHARM REV CODE 250: Performed by: ANESTHESIOLOGY

## 2017-08-08 PROCEDURE — 25000242 PHARM REV CODE 250 ALT 637 W/ HCPCS: Performed by: INTERNAL MEDICINE

## 2017-08-08 PROCEDURE — 99900035 HC TECH TIME PER 15 MIN (STAT)

## 2017-08-08 PROCEDURE — 94640 AIRWAY INHALATION TREATMENT: CPT

## 2017-08-08 PROCEDURE — 63600175 PHARM REV CODE 636 W HCPCS: Performed by: ANESTHESIOLOGY

## 2017-08-08 PROCEDURE — 0WBC4ZX EXCISION OF MEDIASTINUM, PERCUTANEOUS ENDOSCOPIC APPROACH, DIAGNOSTIC: ICD-10-PCS | Performed by: THORACIC SURGERY (CARDIOTHORACIC VASCULAR SURGERY)

## 2017-08-08 PROCEDURE — 71000033 HC RECOVERY, INTIAL HOUR: Performed by: THORACIC SURGERY (CARDIOTHORACIC VASCULAR SURGERY)

## 2017-08-08 PROCEDURE — 37000008 HC ANESTHESIA 1ST 15 MINUTES: Performed by: THORACIC SURGERY (CARDIOTHORACIC VASCULAR SURGERY)

## 2017-08-08 PROCEDURE — 25000003 PHARM REV CODE 250: Performed by: NURSE ANESTHETIST, CERTIFIED REGISTERED

## 2017-08-08 PROCEDURE — 36415 COLL VENOUS BLD VENIPUNCTURE: CPT

## 2017-08-08 PROCEDURE — 36000711: Performed by: THORACIC SURGERY (CARDIOTHORACIC VASCULAR SURGERY)

## 2017-08-08 PROCEDURE — 80053 COMPREHEN METABOLIC PANEL: CPT

## 2017-08-08 PROCEDURE — 88307 TISSUE EXAM BY PATHOLOGIST: CPT | Mod: 26,,, | Performed by: PATHOLOGY

## 2017-08-08 PROCEDURE — 88341 IMHCHEM/IMCYTCHM EA ADD ANTB: CPT | Mod: 26,,, | Performed by: PATHOLOGY

## 2017-08-08 PROCEDURE — 99900103 DSU ONLY-NO CHARGE-INITIAL HR (STAT): Performed by: THORACIC SURGERY (CARDIOTHORACIC VASCULAR SURGERY)

## 2017-08-08 RX ORDER — MORPHINE SULFATE 2 MG/ML
2 INJECTION, SOLUTION INTRAMUSCULAR; INTRAVENOUS EVERY 4 HOURS PRN
Status: DISCONTINUED | OUTPATIENT
Start: 2017-08-08 | End: 2017-08-09 | Stop reason: HOSPADM

## 2017-08-08 RX ORDER — MIDAZOLAM HYDROCHLORIDE 1 MG/ML
INJECTION, SOLUTION INTRAMUSCULAR; INTRAVENOUS
Status: DISCONTINUED | OUTPATIENT
Start: 2017-08-08 | End: 2017-08-08

## 2017-08-08 RX ORDER — SODIUM CHLORIDE 0.9 % (FLUSH) 0.9 %
3 SYRINGE (ML) INJECTION
Status: DISCONTINUED | OUTPATIENT
Start: 2017-08-08 | End: 2017-08-09 | Stop reason: HOSPADM

## 2017-08-08 RX ORDER — ONDANSETRON 2 MG/ML
INJECTION INTRAMUSCULAR; INTRAVENOUS
Status: DISCONTINUED | OUTPATIENT
Start: 2017-08-08 | End: 2017-08-08

## 2017-08-08 RX ORDER — FENTANYL CITRATE 50 UG/ML
INJECTION, SOLUTION INTRAMUSCULAR; INTRAVENOUS
Status: DISCONTINUED | OUTPATIENT
Start: 2017-08-08 | End: 2017-08-08

## 2017-08-08 RX ORDER — ACETAMINOPHEN 10 MG/ML
INJECTION, SOLUTION INTRAVENOUS
Status: DISCONTINUED | OUTPATIENT
Start: 2017-08-08 | End: 2017-08-08

## 2017-08-08 RX ORDER — HYDROMORPHONE HYDROCHLORIDE 2 MG/ML
0.2 INJECTION, SOLUTION INTRAMUSCULAR; INTRAVENOUS; SUBCUTANEOUS EVERY 5 MIN PRN
Status: DISCONTINUED | OUTPATIENT
Start: 2017-08-08 | End: 2017-08-08 | Stop reason: HOSPADM

## 2017-08-08 RX ORDER — DEXAMETHASONE SODIUM PHOSPHATE 4 MG/ML
INJECTION, SOLUTION INTRA-ARTICULAR; INTRALESIONAL; INTRAMUSCULAR; INTRAVENOUS; SOFT TISSUE
Status: DISCONTINUED | OUTPATIENT
Start: 2017-08-08 | End: 2017-08-08

## 2017-08-08 RX ORDER — LIDOCAINE HYDROCHLORIDE 10 MG/ML
1 INJECTION, SOLUTION EPIDURAL; INFILTRATION; INTRACAUDAL; PERINEURAL ONCE
Status: COMPLETED | OUTPATIENT
Start: 2017-08-08 | End: 2017-08-08

## 2017-08-08 RX ORDER — SUCCINYLCHOLINE CHLORIDE 20 MG/ML
INJECTION INTRAMUSCULAR; INTRAVENOUS
Status: DISCONTINUED | OUTPATIENT
Start: 2017-08-08 | End: 2017-08-08

## 2017-08-08 RX ORDER — ROCURONIUM BROMIDE 10 MG/ML
INJECTION, SOLUTION INTRAVENOUS
Status: DISCONTINUED | OUTPATIENT
Start: 2017-08-08 | End: 2017-08-08

## 2017-08-08 RX ORDER — NEOSTIGMINE METHYLSULFATE 1 MG/ML
INJECTION, SOLUTION INTRAVENOUS
Status: DISCONTINUED | OUTPATIENT
Start: 2017-08-08 | End: 2017-08-08

## 2017-08-08 RX ORDER — ALPRAZOLAM 0.25 MG/1
0.5 TABLET ORAL 3 TIMES DAILY PRN
Status: DISCONTINUED | OUTPATIENT
Start: 2017-08-08 | End: 2017-08-09 | Stop reason: HOSPADM

## 2017-08-08 RX ORDER — GLYCOPYRROLATE 0.2 MG/ML
INJECTION INTRAMUSCULAR; INTRAVENOUS
Status: DISCONTINUED | OUTPATIENT
Start: 2017-08-08 | End: 2017-08-08

## 2017-08-08 RX ORDER — METOCLOPRAMIDE HYDROCHLORIDE 5 MG/ML
10 INJECTION INTRAMUSCULAR; INTRAVENOUS EVERY 10 MIN PRN
Status: DISCONTINUED | OUTPATIENT
Start: 2017-08-08 | End: 2017-08-08 | Stop reason: HOSPADM

## 2017-08-08 RX ORDER — METOCLOPRAMIDE HYDROCHLORIDE 5 MG/ML
10 INJECTION INTRAMUSCULAR; INTRAVENOUS ONCE
Status: COMPLETED | OUTPATIENT
Start: 2017-08-08 | End: 2017-08-08

## 2017-08-08 RX ORDER — SODIUM CHLORIDE, SODIUM LACTATE, POTASSIUM CHLORIDE, CALCIUM CHLORIDE 600; 310; 30; 20 MG/100ML; MG/100ML; MG/100ML; MG/100ML
INJECTION, SOLUTION INTRAVENOUS CONTINUOUS
Status: DISCONTINUED | OUTPATIENT
Start: 2017-08-08 | End: 2017-08-08

## 2017-08-08 RX ORDER — PROPOFOL 10 MG/ML
VIAL (ML) INTRAVENOUS
Status: DISCONTINUED | OUTPATIENT
Start: 2017-08-08 | End: 2017-08-08

## 2017-08-08 RX ORDER — PHENYLEPHRINE HYDROCHLORIDE 10 MG/ML
INJECTION INTRAVENOUS
Status: DISCONTINUED | OUTPATIENT
Start: 2017-08-08 | End: 2017-08-08

## 2017-08-08 RX ORDER — LIDOCAINE HCL/PF 100 MG/5ML
SYRINGE (ML) INTRAVENOUS
Status: DISCONTINUED | OUTPATIENT
Start: 2017-08-08 | End: 2017-08-08

## 2017-08-08 RX ADMIN — NEOSTIGMINE METHYLSULFATE 4 MG: 1 INJECTION INTRAVENOUS at 01:08

## 2017-08-08 RX ADMIN — ONDANSETRON 4 MG: 2 INJECTION, SOLUTION INTRAMUSCULAR; INTRAVENOUS at 11:08

## 2017-08-08 RX ADMIN — GUAIFENESIN AND DEXTROMETHORPHAN 5 ML: 100; 10 SYRUP ORAL at 04:08

## 2017-08-08 RX ADMIN — PHENYLEPHRINE HYDROCHLORIDE 80 MCG: 10 INJECTION INTRAVENOUS at 11:08

## 2017-08-08 RX ADMIN — ROCURONIUM BROMIDE 5 MG: 10 INJECTION, SOLUTION INTRAVENOUS at 11:08

## 2017-08-08 RX ADMIN — FLUTICASONE FUROATE AND VILANTEROL TRIFENATATE 1 PUFF: 200; 25 POWDER RESPIRATORY (INHALATION) at 08:08

## 2017-08-08 RX ADMIN — ZOLPIDEM TARTRATE 5 MG: 5 TABLET, FILM COATED ORAL at 11:08

## 2017-08-08 RX ADMIN — OXYCODONE AND ACETAMINOPHEN 1 TABLET: 10; 325 TABLET ORAL at 04:08

## 2017-08-08 RX ADMIN — PROPOFOL 170 MG: 10 INJECTION, EMULSION INTRAVENOUS at 11:08

## 2017-08-08 RX ADMIN — ALPRAZOLAM 0.5 MG: 0.25 TABLET ORAL at 07:08

## 2017-08-08 RX ADMIN — HYDROMORPHONE HYDROCHLORIDE 0.2 MG: 2 INJECTION, SOLUTION INTRAMUSCULAR; INTRAVENOUS; SUBCUTANEOUS at 02:08

## 2017-08-08 RX ADMIN — PHENYLEPHRINE HYDROCHLORIDE 100 MCG: 10 INJECTION INTRAVENOUS at 12:08

## 2017-08-08 RX ADMIN — DEXAMETHASONE SODIUM PHOSPHATE 8 MG: 4 INJECTION, SOLUTION INTRAMUSCULAR; INTRAVENOUS at 11:08

## 2017-08-08 RX ADMIN — LIDOCAINE HYDROCHLORIDE 80 MG: 20 INJECTION, SOLUTION INTRAVENOUS at 11:08

## 2017-08-08 RX ADMIN — FENTANYL CITRATE 50 MCG: 50 INJECTION INTRAMUSCULAR; INTRAVENOUS at 11:08

## 2017-08-08 RX ADMIN — MIDAZOLAM 2 MG: 1 INJECTION INTRAMUSCULAR; INTRAVENOUS at 11:08

## 2017-08-08 RX ADMIN — LIDOCAINE HYDROCHLORIDE 10 MG: 10 INJECTION, SOLUTION EPIDURAL; INFILTRATION; INTRACAUDAL; PERINEURAL at 10:08

## 2017-08-08 RX ADMIN — MOXIFLOXACIN HYDROCHLORIDE 400 MG: 400 INJECTION, SOLUTION INTRAVENOUS at 06:08

## 2017-08-08 RX ADMIN — IPRATROPIUM BROMIDE AND ALBUTEROL SULFATE 3 ML: .5; 3 SOLUTION RESPIRATORY (INHALATION) at 08:08

## 2017-08-08 RX ADMIN — ROCURONIUM BROMIDE 15 MG: 10 INJECTION, SOLUTION INTRAVENOUS at 11:08

## 2017-08-08 RX ADMIN — HYDROMORPHONE HYDROCHLORIDE 0.2 MG: 2 INJECTION, SOLUTION INTRAMUSCULAR; INTRAVENOUS; SUBCUTANEOUS at 01:08

## 2017-08-08 RX ADMIN — METOCLOPRAMIDE 10 MG: 5 INJECTION, SOLUTION INTRAMUSCULAR; INTRAVENOUS at 10:08

## 2017-08-08 RX ADMIN — SODIUM CHLORIDE, SODIUM LACTATE, POTASSIUM CHLORIDE, AND CALCIUM CHLORIDE: .6; .31; .03; .02 INJECTION, SOLUTION INTRAVENOUS at 10:08

## 2017-08-08 RX ADMIN — ACETAMINOPHEN 1000 MG: 10 INJECTION, SOLUTION INTRAVENOUS at 11:08

## 2017-08-08 RX ADMIN — FENTANYL CITRATE 25 MCG: 50 INJECTION INTRAMUSCULAR; INTRAVENOUS at 11:08

## 2017-08-08 RX ADMIN — GLYCOPYRROLATE 0.4 MG: 0.2 INJECTION, SOLUTION INTRAMUSCULAR; INTRAVENOUS at 01:08

## 2017-08-08 RX ADMIN — SUCCINYLCHOLINE CHLORIDE 140 MG: 20 INJECTION, SOLUTION INTRAMUSCULAR; INTRAVENOUS at 11:08

## 2017-08-08 RX ADMIN — ZOLPIDEM TARTRATE 5 MG: 5 TABLET, FILM COATED ORAL at 12:08

## 2017-08-08 RX ADMIN — GUAIFENESIN AND DEXTROMETHORPHAN 5 ML: 100; 10 SYRUP ORAL at 12:08

## 2017-08-08 RX ADMIN — PIPERACILLIN SODIUM AND TAZOBACTAM SODIUM 4.5 G: 4; .5 INJECTION, POWDER, FOR SOLUTION INTRAVENOUS at 10:08

## 2017-08-08 RX ADMIN — PIPERACILLIN SODIUM AND TAZOBACTAM SODIUM 4.5 G: 4; .5 INJECTION, POWDER, FOR SOLUTION INTRAVENOUS at 06:08

## 2017-08-08 RX ADMIN — OXYCODONE AND ACETAMINOPHEN 1 TABLET: 10; 325 TABLET ORAL at 07:08

## 2017-08-08 RX ADMIN — PIPERACILLIN SODIUM AND TAZOBACTAM SODIUM 4.5 G: 4; .5 INJECTION, POWDER, FOR SOLUTION INTRAVENOUS at 12:08

## 2017-08-08 NOTE — TRANSFER OF CARE
Anesthesia Transfer of Care Note    Patient: Maile Light    Procedure(s) Performed: Procedure(s) (LRB):  MEDIASTINOSCOPY (N/A)    Patient location: PACU    Anesthesia Type: general    Transport from OR: Transported from OR on 2-3 L/min O2 by NC with adequate spontaneous ventilation    Post pain: adequate analgesia    Post assessment: no apparent anesthetic complications and tolerated procedure well    Post vital signs: stable    Level of consciousness: awake    Nausea/Vomiting: no nausea/vomiting    Complications: none    Transfer of care protocol was followed      Last vitals:   Visit Vitals  /72 (BP Location: Left arm, Patient Position: Lying, BP Method: Automatic)   Pulse (!) 112   Temp 36.9 °C (98.5 °F) (Temporal)   Resp 18   Ht 5' (1.524 m)   Wt 65.8 kg (145 lb)   SpO2 99%   Breastfeeding? No   BMI 28.32 kg/m²

## 2017-08-08 NOTE — NURSING
Ativan ordered IV per Dr. Toledo this morning as patient is emotionally distraught and inconsolable.  Checked with pre-op nurse/ anesthesia.  No sedative to be given until seen by anesthesia for consent.  Will monitor.

## 2017-08-08 NOTE — NURSING
Pt in room crying and screaming. Refused blood drawn. Refused antibiotic to be hung stating that it burns, offered to start another IV and pt also refused. NP Shea notified. Will continue to monitor closely.

## 2017-08-08 NOTE — PROGRESS NOTES
I attempted to complete the discharge assessment however the pt is currently out of the room. I will return to follow up.  Vandana Christopher LMSW

## 2017-08-08 NOTE — NURSING
Received from PACU.  Awake, alert.  Pleasant and cooperative.  No resp distress, denies trouble swallowing.  Sips of water given.  Tolerated well.  Spouse at bedside.  Will monitor.

## 2017-08-08 NOTE — PLAN OF CARE
Problem: Patient Care Overview  Goal: Plan of Care Review  Outcome: Ongoing (interventions implemented as appropriate)  Pt on room air with Q6 PRN duoneb with daily breo

## 2017-08-08 NOTE — PLAN OF CARE
Pre op assessment performed and questions answered.  Pt very emotional and tearful, anxious.  Reassurance given

## 2017-08-08 NOTE — PLAN OF CARE
Problem: Patient Care Overview  Goal: Plan of Care Review  Outcome: Ongoing (interventions implemented as appropriate)  POC reviewed with pt, verbalized understanding  Pt anxious and crying most part of the night  PRN meds given for pain  Abx infused as ordered  VS monitored  Xanax given for anxiety  Up to BR independently  NPO   Constantly coughing, with mild relief with guaifenesin  SR on tele,   Free of fall or injury  Call light in reach  Will continue to monitor

## 2017-08-08 NOTE — ADDENDUM NOTE
Addendum  created 08/08/17 1347 by Nick Delgadillo MD    Order list changed, Order sets accessed

## 2017-08-08 NOTE — BRIEF OP NOTE
[facility]  Brief Operative Note    SUMMARY     Surgery Date: 8/8/2017     Surgeon(s) and Role:     * Amilcar Martinez MD - Primary    Assisting Surgeon: None    Pre-op Diagnosis:  Abnormal bronchography [R91.8]    Post-op Diagnosis:  Post-Op Diagnosis Codes:     * Abnormal bronchography [R91.8]    Procedure(s) (LRB):  MEDIASTINOSCOPY (N/A)    Anesthesia: General    Description of the findings of the procedure:   Right paratracheal and pre tracheal mass identified and biopsied.  FS confirms small cell Ca    Estimated Blood Loss: 3cc         Specimens:   Specimen (12h ago through future)    Start     Ordered    08/08/17 1222  Specimen to Pathology - Surgery  Once     Comments:  Pre-op Diagnosis: Abnormal bronchography [R91.8]Post-op Diagnosis: same Procedure(s):MEDIASTINOSCOPY Number of specimens: 1Name of specimens: Mediastinal Mass      08/08/17 1236

## 2017-08-08 NOTE — PLAN OF CARE
Attempted to complete assessment however the pt is at bedside frantically crying and speaking to the cardiologist. I will return to follow up. Vandana Christopher LMSW     08/08/17 1613   Discharge Assessment   Assessment Type Discharge Planning Assessment   Confirmed/corrected address and phone number on facesheet? Yes   Assessment information obtained from? Patient

## 2017-08-08 NOTE — PROGRESS NOTES
"Progress Note  Hospital Medicine  Patient Name:Maile Light  MRN:  [id number]  Patient Class: IP- Inpatient  Admit Date: 8/7/2017  Length of Stay: 1 days  Expected Discharge Date:   Attending Physician: Sylvia Simpson MD  Primary Care Provider:  Ricci Guerra MD    SUBJECTIVE:     Principal Problem: SIADH (syndrome of inappropriate ADH production)  Initial history of present illness: Patient is a 51 y.o. female admitted to Hospitalist Service from Pre-op at directions by Dr. Martinez with complaint of "Low sodium level". Patient reportedly has past medical history significant for lung mass, chronic cough, nicotine addiction and chronic hyponatremia for 2 months. Patient is under care of Dr. Toledo. Patient was scheduled for mediastinoscopy today but it was postponed due to hyponatremia. Patient appears anxious and emotionally labile. Her records show on 7/3- Na 128, 7/11- Na 123, and 8/4- Na 121. Patient also reported frequent cough, dry, mostly in the right markel-chest. Denied excessive PO fluid intake. Patient denied chest pain, shortness of breath, abdominal pain, nausea, vomiting, headache, vision changes, focal neuro-deficits or fever.    PMH/PSH/SH/FH/Meds: reviewed.    Symptoms/Review of Systems:  Scheduled for mediastinoscopy today. No shortness of breath, cough, chest pain or headache, fever or abdominal pain.     Diet:  NPO.  Activity level: Normal.    Pain:  Patient reports no pain.       OBJECTIVE:   Vital Signs (Most Recent):      Temp: 99 °F (37.2 °C) (08/08/17 0426)  Pulse: 103 (08/08/17 0805)  Resp: 18 (08/08/17 0805)  BP: 132/76 (08/08/17 0426)  SpO2: 100 % (08/08/17 0805)       Vital Signs Range (Last 24H):  Temp:  [97.7 °F (36.5 °C)-99 °F (37.2 °C)]   Pulse:  []   Resp:  [18-20]   BP: (125-141)/()   SpO2:  [96 %-100 %]     Weight: 65.8 kg (145 lb)  Body mass index is 28.32 kg/m².    Intake/Output Summary (Last 24 hours) at 08/08/17 0819  Last data filed at 08/08/17 0630   Gross per 24 " hour   Intake              750 ml   Output             1100 ml   Net             -350 ml     Physical Examination:  General appearance: well developed, appears stated age, anxious  Head: normocephalic, atraumatic  Eyes:  conjunctivae/corneas clear. PERRL.  Nose: Nares normal. Septum midline.  Throat: lips, mucosa, and tongue normal; teeth and gums normal, no throat erythema.  Neck: supple, symmetrical, trachea midline, no JVD and thyroid not enlarged, symmetric, no tenderness/mass/nodules  Lungs:  clear to auscultation bilaterally and normal respiratory effort  Chest wall: no tenderness  Heart: regular rate and rhythm, S1, S2 normal, no murmur, click, rub or gallop  Abdomen: soft, non-tender non-distented; bowel sounds normal; no masses,  no organomegaly  Extremities: no cyanosis, clubbing or edema.   Pulses: 2+ and symmetric  Skin: Skin color, texture, turgor normal. No rashes or lesions.  Lymph nodes: Cervical, supraclavicular, and axillary nodes normal.  Neurologic: Normal strength and tone. No focal numbness or weakness. CNII-XII intact.      CBC:    Recent Labs  Lab 08/04/17  1402 08/07/17  1025 08/08/17  0424   WBC 7.80 6.60 8.70   RBC 4.46 3.78* 3.93*   HGB 12.7 10.9* 11.3*   HCT 37.9 31.7* 33.4*   * 626* 620*   MCV 85 84 85   MCH 28.6 28.7 28.7   MCHC 33.7 34.2 33.7   BMP    Recent Labs  Lab 08/07/17  1025 08/07/17  2059 08/08/17  0423   * 116* 109   * 128* 130*   K 4.5 4.5 4.7   CL 90* 92* 93*   CO2 19* 24 22*   BUN 7 8 8   CREATININE 0.7 0.8 0.8   CALCIUM 9.7 9.8 9.9      Diagnostic Results:  Microbiology Results (last 7 days)     Procedure Component Value Units Date/Time    Urine culture [[id number]] Collected:  08/07/17 1042    Order Status:  Sent Specimen:  Urine from Urine, Clean Catch Updated:  08/07/17 1857    Culture, Respiratory with Gram Stain [[id number]]     Order Status:  No result Specimen:  Respiratory          Assessment/Plan:      *SIADH (syndrome of inappropriate ADH  production) [E22.2] - better after Samsca use  Follow BMP. Follow nephrology recommendations.  Continue fluid restrictions.     Yes    Anxiety [F41.9]  Chronic problem. Will continue chronic medications and monitor for any changes, adjusting as needed.     Yes    Pneumonia - post-obstructive [J18.9]  Supplemental O2 via nasal canula; titrate O2 saturation to >92%.   Dr. Toledo to see the patient today.  Continue beta 2 agonist bronchodilator treatments.   Continue IV antibiotics - Zosyn and Avelox (avoid hypotonic infusions).  Check sputum GS and Cx.   Continue routine medications as before.      Yes    Chronic cough [R05]  Cough suppressants and PO Oxycodone.     Yes    Smoker [F17.200]  Chronic problem. Will continue chronic medications and monitor for any changes, adjusting as needed.     Yes    Mass of upper lobe of right lung [R91.8]   Yes    Lymphadenopathy, mediastinal [R59.0]  Patient is scheduled for mediastinoscopy.   Yes         VTE Risk Mitigation         Ordered     Place SUE hose  Until discontinued      08/07/17 1011        Sylvia Simpson MD  Department of Hospital Medicine   [facility]

## 2017-08-08 NOTE — PLAN OF CARE
Pt awake alert and oriented, vs stable, pain to R side controlled with PRN pain med, pt tearful but then calmed down after pain med given, pt rested/slept in PACU once pain was controlled, tolerated clear liquids, dressing to R upper back that was there prior to this sx cdi, dressing to throat area s/p this sx cdi, chest xray done, tele monitor [id number] placed back on pt. Ok per Dr. Delgadillo to send pt back to the floor. Pt transported from PACU to room 223 via stretcher. Pt transferred from stretcher to bed independently. Call light within reach. Pt's  at the bedside. Report given to ISABEL Coffey.

## 2017-08-08 NOTE — PLAN OF CARE
08/07/17 2007   Patient Assessment/Suction   Level of Consciousness (AVPU) alert   PRE-TX-O2-ETCO2   O2 Device (Oxygen Therapy) room air   SpO2 96 %   Pulse 102   Resp 20   Temp 98.1 °F (36.7 °C)   BP (!) 140/68

## 2017-08-08 NOTE — PROGRESS NOTES
INPATIENT NEPHROLOGY PROGRESS NOTE  [facility] NEPHROLOGY    Patient Name: Maile Light   Date: 08/08/2017    Reason for consultation: Hyponatremia    Chief Complaint: Admitted for bronchoscopy.     History of Present Illness:  Ms. Light is a 50 y/o F with newly diagnosed SCLC who p/w bronchoscopy for another tissue sample. She says that she was diagnosed about 3-4 weeks ago with lung cancer after outpatient labs showed hyponatremia. Her records show on 7/3- Na 128, 7/11- Na 123, and 8/4- Na 121. Today, her serum Na is 123 so we have been consulted for management.     · Interval History/Subjective:    - serum Na 130 today  - UOP 1.1L, VSS  - in OR    · Review of Systems: Unable to obtain- in OR    Plan of Care:    Assessment:  Hyponatremia, suspect SIADH 2/2 SCLC + PNA  Metabolic acidosis  Anemia  Hematuria     Plan:     1. Hyponatremia- 2/2 SIADH. Serum Na improved with tolvaptan. No need for further doses. D/C fluid restriction.      2. Acidosis- Resolved.     3. Anemia- Hb stable.      4. Microscopic Hematuria- Noted on prior 2 urine samples- there is no proteinuria. Awaiting urine culture. May warrant workup for kidney stones.     Thank you for allowing us to participate in this patient's care. We will continue to follow.    Medications:  No current facility-administered medications on file prior to encounter.      Current Outpatient Prescriptions on File Prior to Encounter   Medication Sig Dispense Refill    albuterol 90 mcg/actuation inhaler 2 puffs every 4 hours as needed for cough, wheeze, or shortness of breath 1 Inhaler 11    alprazolam (XANAX) 0.25 MG tablet Take 1 tablet (0.25 mg total) by mouth 3 (three) times daily as needed for Anxiety. 90 tablet 2    fluticasone-vilanterol (BREO ELLIPTA) 200-25 mcg/dose DsDv diskus inhaler Inhale 1 puff into the lungs once daily. 1 each 11    guaifenesin-codeine 100-10 mg/5 ml (TUSSI-ORGANIDIN NR)  mg/5 mL syrup Take 10 mLs by mouth every 4 (four) hours  as needed for Cough. 473 mL 1    levoFLOXacin (LEVAQUIN) 500 MG tablet Take 1 tablet (500 mg total) by mouth once daily. 10 tablet 1    predniSONE (DELTASONE) 20 MG tablet Take 2 daily for 3 days then One daily for 3 days and repeat for flare of lung symptoms as intructed 24 tablet 0    promethazine (PHENERGAN) 12.5 MG Tab Take 1 tablet (12.5 mg total) by mouth every 4 (four) hours as needed (nausea). 60 tablet 0     Scheduled Meds:   fluticasone-vilanterol  1 puff Inhalation Daily    moxifloxacin  400 mg Intravenous Q24H    pantoprazole  40 mg Oral Daily    piperacillin-tazobactam 4.5 g in sodium chloride 9 % 100 mL IVPB (add-ease)  4.5 g Intravenous Q8H     Continuous Infusions:   lactated Ringers 10 mL/hr at 08/08/17 1027     PRN Meds:.albuterol-ipratropium 2.5mg-0.5mg/3mL, alprazolam, dextromethorphan-guaifenesin  mg/5 ml, lorazepam, morphine, oxycodone-acetaminophen, zolpidem    Allergies:  Review of patient's allergies indicates no known allergies.    Vital Signs:  Temp Readings from Last 3 Encounters:   08/08/17 98.5 °F (36.9 °C) (Temporal)   07/12/17 97.5 °F (36.4 °C)   07/03/17 97.9 °F (36.6 °C) (Oral)     Pulse Readings from Last 3 Encounters:   08/08/17 (!) 112   08/03/17 98   07/12/17 88     BP Readings from Last 3 Encounters:   08/08/17 130/72   08/03/17 (!) 150/86   07/12/17 (!) 178/91     Weight:  Wt Readings from Last 3 Encounters:   08/07/17 65.8 kg (145 lb)   08/03/17 66.7 kg (147 lb 0.8 oz)   07/12/17 67.3 kg (148 lb 4.8 oz)     Physical Exam: Unable to perform- in OR  Constitutional:   Heart:  Lungs:   Abdomen:     Results:  Lab Results   Component Value Date     (L) 08/08/2017    K 4.7 08/08/2017    CL 93 (L) 08/08/2017    CO2 22 (L) 08/08/2017    BUN 8 08/08/2017    CREATININE 0.8 08/08/2017    CALCIUM 9.9 08/08/2017    ANIONGAP 15 08/08/2017    ESTGFRAFRICA >60 08/08/2017    EGFRNONAA >60 08/08/2017     Lab Results   Component Value Date    CALCIUM 9.9 08/08/2017    PHOS  4.0 08/07/2017       Recent Labs  Lab 08/08/17  0424   WBC 8.70   RBC 3.93*   HGB 11.3*   HCT 33.4*   *   MCV 85   MCH 28.7   MCHC 33.7     I have personally reviewed pertinent radiological imaging and reports.    Germaine Luna MD MPH  [facility]  [address] [place] [address]  [place] [address]  [phone] (p)  [phone] (f)

## 2017-08-08 NOTE — CHAPLAIN
"The  listened to the tearful patient's concerns regarding her lengthy illnesses and their impact on her marriage and life, in general. The patient expressed stress that her frequent hospitalizations, and doctor's visits had not given her hope for recovery, and lamented the possibility that doctors might propose chemo and/or radiation for her treatment. The patient  seemed disappointed, angry, and profoundly depressed and seemed to  brighten when the  wondered whether she might prefer  "no treatment"; she stated "I just want to enjoy whatever time I have left". The patient said she had not talked with her  about what they both might consider her best choice for care; the  encouraged her to have that conversation soon. The patient welcomed prayer and the  prayed with her at bedside; she thanked the  for care, saying "you've helped me feel better; thank you for visiting and listening". The patient did cry throughout this visit. The  will continue to care for patient and family.  "

## 2017-08-08 NOTE — ANESTHESIA POSTPROCEDURE EVALUATION
Anesthesia Post Evaluation    Patient: Maile Light    Procedure(s) Performed: Procedure(s) (LRB):  MEDIASTINOSCOPY (N/A)    Final Anesthesia Type: general  Patient location during evaluation: PACU  Patient participation: Yes- Able to Participate  Level of consciousness: awake and alert  Post-procedure vital signs: reviewed and stable  Pain management: adequate  Airway patency: patent  PONV status at discharge: No PONV  Anesthetic complications: no      Cardiovascular status: blood pressure returned to baseline  Respiratory status: unassisted  Hydration status: euvolemic  Follow-up not needed.        Visit Vitals  BP (!) 160/85   Pulse 104   Temp 37.1 °C (98.8 °F)   Resp 20   Ht 5' (1.524 m)   Wt 65.8 kg (145 lb)   SpO2 97%   Breastfeeding? No   BMI 28.32 kg/m²       Pain/Ana Score: Pain Assessment Performed: Yes (8/8/2017  1:10 PM)  Presence of Pain: non-verbal indicators absent (light sedation, vss, no indicators of pain noted) (8/8/2017  1:10 PM)  Pain Rating Prior to Med Admin: 8 (8/8/2017 10:33 AM)  Pain Rating Post Med Admin: 6 (8/8/2017  5:05 AM)  Ana Score: 10 (8/8/2017 10:33 AM)

## 2017-08-08 NOTE — NURSING
Patient returned to her room, ambulatory.  States she went downstairs to smoke.  Instructed patient to let staff know before leaving the unit.  Small amt of tan drainage noted on bandage (throat).  She denies pain or discomfort.  Also reminded patient this is a smoke free facility and she just had surgery.

## 2017-08-08 NOTE — CONSULTS
2017      Admit Date: 2017  Maile Light  New Patient Consult    No chief complaint on file.  lung mass.    History of Present Illness:   Pt was seen by me  refferred by Dr Briones, had bronch 7/3. She has right para trach mass and rul mass.  tbbx and paratracheal needle bx were not diagnostic.  Pt was referred to Dr Heller - pt cancelled initial eval and was to have bx  but was felt to be unstable with low na.  Pt today is hysterical and sobbing,  C/o pain around right scapula radiating to beneath right anterior breast- 10/10, new onset last couple days.  Pt was seen in office last week, had asbcess over right scapula I and d.  Culture has been negative. Pt not eating and had some diarrhea last wk up to 2-3 daily.          PFSH:  Past Medical History:   Diagnosis Date    Arthritis     Cancer     LUNG 7-17    Wears glasses      Past Surgical History:   Procedure Laterality Date    BTL       SECTION      COLON SURGERY      COLONOSCOPY      HERNIA MESH REMOVAL      HERNIA REPAIR      X 7 ABD     Social History   Substance Use Topics    Smoking status: Current Every Day Smoker     Packs/day: 0.25     Years: 25.00     Types: Cigarettes    Smokeless tobacco: Never Used    Alcohol use Yes      Comment: social     Family History   Problem Relation Age of Onset    Cancer Mother     Cancer Father      Review of patient's allergies indicates:  No Known Allergies         Review of Systems:  due to neurologic/psyc status/impairments a Review of Systems could not be obtained       Exam:Comprehensive exam done. /68 (BP Location: Left arm, Patient Position: Lying, BP Method: Automatic)   Pulse 95   Temp 98.2 °F (36.8 °C) (Oral)   Resp 18   Ht 5' (1.524 m)   Wt 65.8 kg (145 lb)   SpO2 100%   Breastfeeding? No   BMI 28.32 kg/m²   Exam included Vitals as listed, and patient's appearance and affect and alertness and mood, oral exam for yeast and hygiene and pharynx lesions  and Mallapatti (M) score, neck with inspection for jvd and masses and thyroid abnormalities and lymph nodes (supraclavicular and infraclavicular nodes also examined and noted if abn), chest exam included symmetry and effort and fremitus and percussion and auscultation, cardiac exam included rhythm and gallops and murmur and rubs and jvd and edema, abdominal exam for mass and hepatosplenomegaly and tenderness and hernias and bowel sounds, Musculoskeletal exam with muscle tone and posture and mobility/gait and  strenght, and skin for rashes and cyanosis and pallor and turgor, extremity for clubbing.  Findings were normal except as listed below:  M2, abscess bandaged and min tender right supra scapula area, sobbing and very anxious, good bs, nl resonance,no edema, soft abd, no clubbing    Radiographs reviewed: view by direct vision  Right lung mass enlarging over last month.  Results for orders placed during the hospital encounter of 07/03/17   X-Ray Chest 1 View    Narrative AP chest without comparison    Findings: There is a masslike opacity in the right hilum and right paratracheal region.  Mild hazy airspace disease is present within the right upper lobe.  No pleural effusion or pneumothorax.  The heart size is normal.    Impression 1.  Right hilar/paratracheal opacity concerning for mass and/or lymphadenopathy.  2.  Mild right upper lobe airspace disease.      Electronically signed by: Jamarcus Augustin MD  Date:     07/05/17  Time:    08:40    ]    Labs       Recent Labs  Lab 08/08/17  0424   WBC 8.70   HGB 11.3*   HCT 33.4*   *     Recent Labs  Lab 08/07/17 2059 08/08/17  0423   * 130*   K 4.5 4.7   CL 92* 93*   CO2 24 22*   BUN 8 8   CREATININE 0.8 0.8   * 109   CALCIUM 9.8 9.9   PHOS 4.0  --    AST  --  24   ALT  --  30   ALKPHOS  --  120   BILITOT  --  0.4   PROT  --  7.8   ALBUMIN 2.6* 2.7*   No results for input(s): PH, PCO2, PO2, HCO3 in the last 24 hours.  Microbiology Results  (last 7 days)     Procedure Component Value Units Date/Time    Urine culture [[id number]] Collected:  08/07/17 1042    Order Status:  Sent Specimen:  Urine from Urine, Clean Catch Updated:  08/07/17 1857    Culture, Respiratory with Gram Stain [[id number]]     Order Status:  No result Specimen:  Respiratory           Impression:  Active Hospital Problems    Diagnosis  POA    *SIADH (syndrome of inappropriate ADH production) [E22.2]  Yes    Anxiety [F41.9]  Yes    Pneumonia - post-obstructive [J18.9]  Yes    Mass of lung [R91.8]  Yes    Chronic cough [R05]  Yes    Smoker [F17.200]  Yes    Mass of upper lobe of right lung [R91.8]  Yes    Lymphadenopathy, mediastinal [R59.0]  Yes      Resolved Hospital Problems    Diagnosis Date Resolved POA   No resolved problems to display.               Plan:     Pt needs tissue dx,suspect sm cell and if can get through rx - may do well, emotionally very unstable.  Would dose ativan pre op and morphine for pain, need ocology involved, abx reasonable.

## 2017-08-08 NOTE — ANESTHESIA PREPROCEDURE EVALUATION
08/08/2017  Maile Light is a 51 y.o., female.    Anesthesia Evaluation    I have reviewed the Patient Summary Reports.    I have reviewed the Nursing Notes.   I have reviewed the Medications.     Review of Systems  Anesthesia Hx:  Denies Family Hx of Anesthesia complications.   Denies Personal Hx of Anesthesia complications.   Hematology/Oncology:         -- Anemia:   Cardiovascular:  Cardiovascular Normal     Pulmonary:   Pneumonia COPD, moderate Asthma moderate Right pulmonary mass,lymphadenopathy and post obstructive pneumonia and paraneoplastic syndrome/SIADH   Hepatic/GI:  Hepatic/GI Normal    Neurological:  Neurology Normal    Endocrine:  Endocrine Normal        Physical Exam  General:  Well nourished    Airway/Jaw/Neck:  Airway Findings: Mouth Opening: Normal Tongue: Normal  General Airway Assessment: Adult  Mallampati: III  Improves to II with phonation.  TM Distance: Normal, at least 6 cm  Jaw/Neck Findings:  Neck ROM: Normal ROM  Neck Findings:      Dental:  Dental Findings: In tact   Chest/Lungs:  Chest/Lungs Findings: Decreased Breathe Sounds Right, Expiratory Wheezes, Mild     Heart/Vascular:  Heart Findings: Rate: Normal  Rhythm: Regular Rhythm  Sounds: Normal             Anesthesia Plan  Type of Anesthesia, risks & benefits discussed:  Anesthesia Type:  general  Patient's Preference:   Intra-op Monitoring Plan: standard ASA monitors  Intra-op Monitoring Plan Comments:   Post Op Pain Control Plan:   Post Op Pain Control Plan Comments:   Induction:   IV  Beta Blocker:  Patient is not currently on a Beta-Blocker (No further documentation required).       Informed Consent: Patient understands risks and agrees with Anesthesia plan.  Questions answered. Anesthesia consent signed with patient.  ASA Score: 3     Day of Surgery Review of History & Physical:    H&P update referred to the surgeon.          Ready For Surgery From Anesthesia Perspective.

## 2017-08-08 NOTE — OP NOTE
DATE OF PROCEDURE:  08/08/2017    PREOPERATIVE DIAGNOSES:  Mediastinal adenopathy, right paratracheal mass.    POSTOPERATIVE DIAGNOSES:  Mediastinal adenopathy, right paratracheal mass.    TITLE OF OPERATION:  Mediastinoscopy with biopsy.    SURGEON:  Amilcar Martinez M.D.    PROCEDURE IN DETAIL:  The patient was taken to the Operating Room, placed in   supine position after adequate induction of general anesthesia, was prepped and   draped in the usual sterile fashion.  A small transverse incision was made   approximately 1 fingerbreadth above the suprasternal notch, taken down to the   subcutaneous tissue.  Hemostasis maintained with electrocautery.  Dissection was   continued in a vertical midline down to the level of the trachea where the   pretracheal space was carefully developed using gentle digital dissection.  Once   this was done, the mediastinoscope was inserted and then exploration was   undertaken as expected based on preoperative studies.  Mediastinal mass was   identified right peritracheal, lower paratracheal, and pretracheal area and   multiple biopsies were taken.  The initial biopsy that was sent downstairs for   frozen section confirmed small cell carcinoma.  Remaining samples were sent for   permanent.  After good hemostasis was assured, we proceeded to remove the scope   and proceeded to close first the subcutaneous tissue using 3-0 Vicryl running   suture and then the skin edges were reapproximated using a 4-0 Vicryl running   subcuticular stitch.  The wound was cleaned, sterile dressing applied.  The   patient tolerated the procedure well and was transported to the Recovery Room in   stable condition.      CHER  dd: 08/08/2017 13:17:59 (CDT)  td: 08/08/2017 15:02:53 (CDT)  Doc ID   #[id number]  Job ID #[id number]    CC:

## 2017-08-08 NOTE — NURSING
Pt up coughing gave her PRN guaifenesin. Pt wanted pain med and was reminded that she received pain med at 2200. Pt got agitated and stated that she should be due for pain med. Explained that med is q6hrs. Pt then started to get mad  and started crying. Gave pt ativan. Will cont monitor.

## 2017-08-08 NOTE — NURSING
Spoke to pt about importance of blood drawn, and ABx infusion. Pt has agreed to have lab drawn and ABx.

## 2017-08-09 VITALS
HEIGHT: 60 IN | BODY MASS INDEX: 28.47 KG/M2 | RESPIRATION RATE: 18 BRPM | TEMPERATURE: 98 F | WEIGHT: 145 LBS | OXYGEN SATURATION: 95 % | DIASTOLIC BLOOD PRESSURE: 75 MMHG | HEART RATE: 94 BPM | SYSTOLIC BLOOD PRESSURE: 123 MMHG

## 2017-08-09 LAB
ALBUMIN SERPL BCP-MCNC: 2.5 G/DL
ALP SERPL-CCNC: 109 U/L
ALT SERPL W/O P-5'-P-CCNC: 25 U/L
ANION GAP SERPL CALC-SCNC: 12 MMOL/L
AST SERPL-CCNC: 20 U/L
BACTERIA UR CULT: NO GROWTH
BASOPHILS # BLD AUTO: 0 K/UL
BASOPHILS NFR BLD: 0.1 %
BILIRUB SERPL-MCNC: 0.3 MG/DL
BUN SERPL-MCNC: 8 MG/DL
CALCIUM SERPL-MCNC: 9.9 MG/DL
CHLORIDE SERPL-SCNC: 98 MMOL/L
CO2 SERPL-SCNC: 25 MMOL/L
CREAT SERPL-MCNC: 0.7 MG/DL
DIASTOLIC DYSFUNCTION: NO
DIFFERENTIAL METHOD: ABNORMAL
EOSINOPHIL # BLD AUTO: 0 K/UL
EOSINOPHIL NFR BLD: 0 %
ERYTHROCYTE [DISTWIDTH] IN BLOOD BY AUTOMATED COUNT: 14.1 %
EST. GFR  (AFRICAN AMERICAN): >60 ML/MIN/1.73 M^2
EST. GFR  (NON AFRICAN AMERICAN): >60 ML/MIN/1.73 M^2
ESTIMATED PA SYSTOLIC PRESSURE: 24.34
FUNGUS SPEC CULT: NORMAL
GLUCOSE SERPL-MCNC: 120 MG/DL
HCT VFR BLD AUTO: 30.1 %
HGB BLD-MCNC: 10.2 G/DL
LYMPHOCYTES # BLD AUTO: 0.7 K/UL
LYMPHOCYTES NFR BLD: 8.6 %
MCH RBC QN AUTO: 28.6 PG
MCHC RBC AUTO-ENTMCNC: 33.8 G/DL
MCV RBC AUTO: 85 FL
MITRAL VALVE REGURGITATION: NORMAL
MONOCYTES # BLD AUTO: 0.7 K/UL
MONOCYTES NFR BLD: 9.1 %
NEUTROPHILS # BLD AUTO: 6.7 K/UL
NEUTROPHILS NFR BLD: 82.2 %
PLATELET # BLD AUTO: 614 K/UL
PMV BLD AUTO: 6.8 FL
POTASSIUM SERPL-SCNC: 4.6 MMOL/L
PROT SERPL-MCNC: 7.3 G/DL
RBC # BLD AUTO: 3.55 M/UL
RETIRED EF AND QEF - SEE NOTES: 65 (ref 55–65)
SODIUM SERPL-SCNC: 135 MMOL/L
TRICUSPID VALVE REGURGITATION: NORMAL
WBC # BLD AUTO: 8.1 K/UL

## 2017-08-09 PROCEDURE — 99900035 HC TECH TIME PER 15 MIN (STAT)

## 2017-08-09 PROCEDURE — 36415 COLL VENOUS BLD VENIPUNCTURE: CPT

## 2017-08-09 PROCEDURE — 99239 HOSP IP/OBS DSCHRG MGMT >30: CPT | Mod: ,,, | Performed by: INTERNAL MEDICINE

## 2017-08-09 PROCEDURE — 25000003 PHARM REV CODE 250: Performed by: INTERNAL MEDICINE

## 2017-08-09 PROCEDURE — 80053 COMPREHEN METABOLIC PANEL: CPT

## 2017-08-09 PROCEDURE — 99231 SBSQ HOSP IP/OBS SF/LOW 25: CPT | Mod: ,,, | Performed by: INTERNAL MEDICINE

## 2017-08-09 PROCEDURE — 85025 COMPLETE CBC W/AUTO DIFF WBC: CPT

## 2017-08-09 PROCEDURE — 94640 AIRWAY INHALATION TREATMENT: CPT

## 2017-08-09 PROCEDURE — 94761 N-INVAS EAR/PLS OXIMETRY MLT: CPT

## 2017-08-09 PROCEDURE — 93306 TTE W/DOPPLER COMPLETE: CPT

## 2017-08-09 RX ORDER — LEVOFLOXACIN 500 MG/1
500 TABLET, FILM COATED ORAL DAILY
Qty: 7 TABLET | Refills: 0 | Status: SHIPPED | OUTPATIENT
Start: 2017-08-09

## 2017-08-09 RX ORDER — AMOXICILLIN AND CLAVULANATE POTASSIUM 875; 125 MG/1; MG/1
1 TABLET, FILM COATED ORAL 2 TIMES DAILY
Qty: 14 TABLET | Refills: 0 | Status: SHIPPED | OUTPATIENT
Start: 2017-08-09 | End: 2017-08-16

## 2017-08-09 RX ADMIN — OXYCODONE AND ACETAMINOPHEN 1 TABLET: 10; 325 TABLET ORAL at 01:08

## 2017-08-09 RX ADMIN — PIPERACILLIN SODIUM AND TAZOBACTAM SODIUM 4.5 G: 4; .5 INJECTION, POWDER, FOR SOLUTION INTRAVENOUS at 08:08

## 2017-08-09 RX ADMIN — GUAIFENESIN AND DEXTROMETHORPHAN 5 ML: 100; 10 SYRUP ORAL at 01:08

## 2017-08-09 RX ADMIN — PIPERACILLIN SODIUM AND TAZOBACTAM SODIUM 4.5 G: 4; .5 INJECTION, POWDER, FOR SOLUTION INTRAVENOUS at 12:08

## 2017-08-09 RX ADMIN — ALPRAZOLAM 0.5 MG: 0.25 TABLET ORAL at 07:08

## 2017-08-09 RX ADMIN — FLUTICASONE FUROATE AND VILANTEROL TRIFENATATE 1 PUFF: 200; 25 POWDER RESPIRATORY (INHALATION) at 08:08

## 2017-08-09 RX ADMIN — PANTOPRAZOLE SODIUM 40 MG: 40 TABLET, DELAYED RELEASE ORAL at 09:08

## 2017-08-09 RX ADMIN — OXYCODONE AND ACETAMINOPHEN 1 TABLET: 10; 325 TABLET ORAL at 07:08

## 2017-08-09 RX ADMIN — GUAIFENESIN AND DEXTROMETHORPHAN 5 ML: 100; 10 SYRUP ORAL at 05:08

## 2017-08-09 NOTE — PROGRESS NOTES
Progress Note  PULMONARY    Admit Date: 8/7/2017 08/09/2017      SUBJECTIVE:     Aug 9,calm today, rational, no c/o, right chest pain is better?      PFSH and Allergies reviewed.    OBJECTIVE:     Vitals (Most recent):  Vitals:    08/09/17 1130   BP: (!) 164/74   Pulse: 89   Resp: 16   Temp: 97.5 °F (36.4 °C)       Vitals (24 hour range):  Temp:  [97.5 °F (36.4 °C)-98.6 °F (37 °C)]   Pulse:  []   Resp:  [14-20]   BP: (121-164)/()   SpO2:  [94 %-100 %]       Intake/Output Summary (Last 24 hours) at 08/09/17 1328  Last data filed at 08/09/17 0300   Gross per 24 hour   Intake           478.75 ml   Output                0 ml   Net           478.75 ml          Physical Exam:  The patient's neuro status (alertness,orientation,cognitive function,motor skills,), pharyngeal exam (oral lesions, hygiene, abn dentition,), Neck (jvd,mass,thyroid,nodes in neck and above/below clavicle),RESPIRATORY(symmetry,effort,fremitus,percussion,auscultation),  Cor(rhythm,heart tones including gallops,perfusion,edema)ABD(distention,hepatic&splenomegaly,tenderness,masses), Skin(rash,cyanosis),Psyc(affect,judgement,).  Exam negative except for these pertinent findings:     post mediastinoscopy, good bs,     Radiographs reviewed: view by direct vision  No new   Results for orders placed during the hospital encounter of 07/03/17   X-Ray Chest 1 View    Narrative AP chest without comparison    Findings: There is a masslike opacity in the right hilum and right paratracheal region.  Mild hazy airspace disease is present within the right upper lobe.  No pleural effusion or pneumothorax.  The heart size is normal.    Impression 1.  Right hilar/paratracheal opacity concerning for mass and/or lymphadenopathy.  2.  Mild right upper lobe airspace disease.      Electronically signed by: Jamarcus Augustin MD  Date:     07/05/17  Time:    08:40    ]    Labs       Recent Labs  Lab 08/09/17  0424   WBC 8.10   HGB 10.2*   HCT 30.1*   *      Recent Labs  Lab 08/09/17  0424   *   K 4.6   CL 98   CO2 25   BUN 8   CREATININE 0.7   *   CALCIUM 9.9   AST 20   ALT 25   ALKPHOS 109   BILITOT 0.3   PROT 7.3   ALBUMIN 2.5*   No results for input(s): PH, PCO2, PO2, HCO3 in the last 24 hours.  Microbiology Results (last 7 days)     Procedure Component Value Units Date/Time    Urine culture [[id number]] Collected:  08/07/17 1042    Order Status:  Completed Specimen:  Urine from Urine, Clean Catch Updated:  08/09/17 0041     Urine Culture, Routine No growth    Culture, Respiratory with Gram Stain [[id number]]     Order Status:  No result Specimen:  Respiratory           Impression:  Active Hospital Problems    Diagnosis  POA    *SIADH (syndrome of inappropriate ADH production) [E22.2]  Yes    Anxiety [F41.9]  Yes    Pneumonia - post-obstructive [J18.9]  Yes    Mass of lung [R91.8]  Yes    Chronic cough [R05]  Yes    Smoker [F17.200]  Yes    Mass of upper lobe of right lung [R91.8]  Yes    Lymphadenopathy, mediastinal [R59.0]  Yes      Resolved Hospital Problems    Diagnosis Date Resolved POA   No resolved problems to display.               Plan:       Aug 9, outpt ok, home on fluid restriction,  Needs f/u dr Briones.                                  .

## 2017-08-09 NOTE — PROGRESS NOTES
INPATIENT NEPHROLOGY PROGRESS NOTE  [facility] NEPHROLOGY    Patient Name: Maile Light  Date: 08/09/2017    Reason for consultation: Hyponatremia    Chief Complaint: Admitted for bronchoscopy.     History of Present Illness:  Ms. Light is a 50 y/o F with newly diagnosed SCLC who p/w bronchoscopy for another tissue sample. She says that she was diagnosed about 3-4 weeks ago with lung cancer after outpatient labs showed hyponatremia. Her records show on 7/3- Na 128, 7/11- Na 123, and 8/4- Na 121. Today, her serum Na is 123 so we have been consulted for management.     · Interval History/Subjective:    - VSS  - no cp, dyspnea, abd pain or edema    · Review of Systems: All 14 systems reviewed and negative, except as noted in HPI    Plan of Care:    Assessment:  Hyponatremia, suspect SIADH 2/2 SCLC + PNA  Anemia  Hematuria     Plan:     1. Hyponatremia- 2/2 SIADH. Serum Na improved with tolvaptan. D/C with 1.5L fluid restriction. Needs repeat renal panel on Monday.    2. Anemia- Hb is stable.      3. Microscopic Hematuria- Repeat UA as outpatient- may need stone workup.    Thank you for allowing us to participate in this patient's care. We will continue to follow.    Medications:  No current facility-administered medications on file prior to encounter.      Current Outpatient Prescriptions on File Prior to Encounter   Medication Sig Dispense Refill    albuterol 90 mcg/actuation inhaler 2 puffs every 4 hours as needed for cough, wheeze, or shortness of breath 1 Inhaler 11    alprazolam (XANAX) 0.25 MG tablet Take 1 tablet (0.25 mg total) by mouth 3 (three) times daily as needed for Anxiety. 90 tablet 2    fluticasone-vilanterol (BREO ELLIPTA) 200-25 mcg/dose DsDv diskus inhaler Inhale 1 puff into the lungs once daily. 1 each 11    guaifenesin-codeine 100-10 mg/5 ml (TUSSI-ORGANIDIN NR)  mg/5 mL syrup Take 10 mLs by mouth every 4 (four) hours as needed for Cough. 473 mL 1    predniSONE (DELTASONE) 20 MG  tablet Take 2 daily for 3 days then One daily for 3 days and repeat for flare of lung symptoms as intructed 24 tablet 0    [DISCONTINUED] levoFLOXacin (LEVAQUIN) 500 MG tablet Take 1 tablet (500 mg total) by mouth once daily. 10 tablet 1    promethazine (PHENERGAN) 12.5 MG Tab Take 1 tablet (12.5 mg total) by mouth every 4 (four) hours as needed (nausea). 60 tablet 0     Scheduled Meds:   fluticasone-vilanterol  1 puff Inhalation Daily    moxifloxacin  400 mg Intravenous Q24H    pantoprazole  40 mg Oral Daily    piperacillin-tazobactam 4.5 g in sodium chloride 9 % 100 mL IVPB (add-ease)  4.5 g Intravenous Q8H     Continuous Infusions:   PRN Meds:.albuterol-ipratropium 2.5mg-0.5mg/3mL, alprazolam, dextromethorphan-guaifenesin  mg/5 ml, morphine, oxycodone-acetaminophen, sodium chloride 0.9%, zolpidem    Allergies:  Review of patient's allergies indicates no known allergies.    Vital Signs:  Temp Readings from Last 3 Encounters:   08/09/17 98 °F (36.7 °C) (Oral)   07/12/17 97.5 °F (36.4 °C)   07/03/17 97.9 °F (36.6 °C) (Oral)     Pulse Readings from Last 3 Encounters:   08/09/17 94   08/03/17 98   07/12/17 88     BP Readings from Last 3 Encounters:   08/09/17 123/75   08/03/17 (!) 150/86   07/12/17 (!) 178/91     Weight:  Wt Readings from Last 3 Encounters:   08/07/17 65.8 kg (145 lb)   08/03/17 66.7 kg (147 lb 0.8 oz)   07/12/17 67.3 kg (148 lb 4.8 oz)     Physical Exam:  Constitutional: nad, aao x 3  Heart: rrr, no m/r/g, no edema  Lungs: no w/r/r/c, ctab  Abdomen: s/nt/nd, +BS    Results:  Lab Results   Component Value Date     (L) 08/09/2017    K 4.6 08/09/2017    CL 98 08/09/2017    CO2 25 08/09/2017    BUN 8 08/09/2017    CREATININE 0.7 08/09/2017    CALCIUM 9.9 08/09/2017    ANIONGAP 12 08/09/2017    ESTGFRAFRICA >60 08/09/2017    EGFRNONAA >60 08/09/2017     Lab Results   Component Value Date    CALCIUM 9.9 08/09/2017    PHOS 4.0 08/07/2017       Recent Labs  Lab 08/09/17  0424   WBC 8.10    RBC 3.55*   HGB 10.2*   HCT 30.1*   *   MCV 85   MCH 28.6   MCHC 33.8     I have personally reviewed pertinent radiological imaging and reports.    Germaine Luna MD MPH  [facility] [address] [place] [address]  [phone] (p)  [phone] (f)

## 2017-08-09 NOTE — CONSULTS
51 year old female with RUL mass, hilar and mediastinal LN.  Bronchoscopy with bx non diagnostic for cancer.  Mediastinoscopy done, frozen section, verbal report, small   Cell lung Ca, await a written report.  Smoker 1 ppd x's 25 years.    Na 128-130.  Suspect SAIDH from lung cancer.    Hx HTN, LBP    Hx of hernia repair x's 7, BTL, .    Allergy to meds no.    Mom Ca ? Type  Dad GI cancer, colostomy  Hx heart dx, renal dx    LN NP cervical, axillary, supraclavicular, inguinal areas.  BS distant, clear L & R lung.  RR without m.  L & R breast NT, without palpable mass.  Abdomen NT, L/S NP.  Calf NT, no edema.  Chest wall, back NT.    Creat. O.8, LFT's NL.  Na 130.  Hgb11.3., WBC 8,700.  Xrays- RUL mass, increased hilar and mediastinal LN.    If small cell lung cancer confirmed, I plan to treat her with Carboplatin, -16 over 3 days, x's 1 cycle to decrease bulky   Lymphadenopathy.    Thereafter, plan to convert her over to Cisplatin, -16 with   Concurrent Rad Rx.     Out pt PET scan was negative for distant metastatic dx.  MRI of brain was negative for metastatic dx.    Check 2 D ECHO for ejection fraction.    Await path report.

## 2017-08-09 NOTE — PLAN OF CARE
CM met with patient and Amilcar Light [phone]- spouse and completed discharge planning assessment. Patient lives with spouse, reports independence, does not drive, pharmacy is [facility], she has had HH in the past but does not recall name of agency and spouse does not feel like she will need HH when discharged this time. Patient plans to return home with no needs. She has a nebulizer, PCP is KEDAR Guerra, denies  A POA or living will. Patient and spouse instructed on purpose of discharge planning folder and left folder in room.      08/09/17 1055   Discharge Assessment   Assessment Type Discharge Planning Assessment   Confirmed/corrected address and phone number on facesheet? Yes   Assessment information obtained from? Patient   Communicated expected length of stay with patient/caregiver yes   Prior to hospitilization cognitive status: Alert/Oriented   Prior to hospitalization functional status: Independent   Current cognitive status: Alert/Oriented   Current Functional Status: Independent   Arrived From home or self-care   Lives With spouse  (Amilcar Light [phone])   Able to Return to Prior Arrangements yes   Is patient able to care for self after discharge? Yes   How many people do you have in your home that can help with your care after discharge? 1   Who are your caregiver(s) and their phone number(s)? Amilcar Light [phone]- spouse   Patient's perception of discharge disposition home or selfcare   Readmission Within The Last 30 Days no previous admission in last 30 days   Patient currently being followed by outpatient case management? No   Patient currently receives home health services? No   Does the patient currently use HME? No   Patient currently receives private duty nursing? No   Patient currently receives any other outside agency services? No   Equipment Currently Used at Home (nebulizer)   Do you have any problems affording any of your prescribed medications? No   Is the patient taking  medications as prescribed? yes   Do you have any financial concerns preventing you from receiving the healthcare you need? No   Does the patient have transportation to healthcare appointments? Yes   Transportation Available family or friend will provide   On Dialysis? No   Does the patient receive services at the Coumadin Clinic? No   Are there any open cases? No   Discharge Plan A Home   Patient/Family In Agreement With Plan yes

## 2017-08-09 NOTE — PLAN OF CARE
Problem: Pain, Acute (Adult)  Goal: Acceptable Pain Control/Comfort Level  Patient will demonstrate the desired outcomes by discharge/transition of care.   Outcome: Ongoing (interventions implemented as appropriate)   08/09/17 0041   Pain, Acute (Adult)   Acceptable Pain Control/Comfort Level making progress toward outcome

## 2017-08-09 NOTE — NURSING
Discharge instructions given to patient.  rx for augmentin called in to pharmacy.  Instructed on fluid restriction of 1.5l, med regimen and f/u appoint.  She v/u.  Iv an tele removed.  Discharged to home.

## 2017-08-09 NOTE — PLAN OF CARE
08/08/17 2000   Aerosol Therapy   $ Aerosol Therapy Charges PRN treatment not required   Respiratory Treatment Status PRN treatment not required

## 2017-08-09 NOTE — DISCHARGE SUMMARY
"Discharge Summary  Hospital Medicine    Admit Date: 8/7/2017    Date and Time: 8/9/20172:09 PM    Discharge Attending Physician: Sylvia Simpson MD    Primary Care Physician: Ricci Guerra MD    Diagnoses:  Active Hospital Problems    Diagnosis  POA    *SIADH (syndrome of inappropriate ADH production) [E22.2]  Yes    Anxiety [F41.9]  Yes    Pneumonia - post-obstructive [J18.9]  Yes    Mass of lung [R91.8]  Yes    Chronic cough [R05]  Yes    Smoker [F17.200]  Yes    Mass of upper lobe of right lung [R91.8]  Yes    Lymphadenopathy, mediastinal [R59.0]  Yes      Resolved Hospital Problems    Diagnosis Date Resolved POA   No resolved problems to display.     Discharged Condition: Good    Hospital Course:   Patient is a 51 y.o. female admitted to Hospitalist Service from Pre-op at directions by Dr. Martinez with complaint of "Low sodium level". Patient reportedly has past medical history significant for lung mass, chronic cough, nicotine addiction and chronic hyponatremia for 2 months. Patient is under care of Dr. Toledo. Patient was scheduled for mediastinoscopy today but it was postponed due to hyponatremia. Patient appears anxious and emotionally labile. Her records show on 7/3- Na 128, 7/11- Na 123, and 8/4- Na 121. Patient also reported frequent cough, dry, mostly in the right markel-chest. Denied excessive PO fluid intake. Patient denied chest pain, shortness of breath, abdominal pain, nausea, vomiting, headache, vision changes, focal neuro-deficits or fever. Patient was admitted to Hospitalist medicine service. Patient was evaluated by Dr. Toledo, Dr. Martinez and Dr. Luna. Patient received a dose of Tolvaptan with improved hyponatremia. Patient under went mediastinoscopy by Dr. Martinez. Fluid restriction discussed with patient. Dr. SERG Briones followed the patient. Symptoms improved. Patient treated for pneumonia with IV antibiotics. Patient was discharged home in stable condition with following discharge plan of " care. Total time with the patient was 30 minutes and greater than 50% was spent in counseling and coordination of care. The assessment and plan have been discussed at length. Physicians' notes reviewed. Labs and procedure reviewed.       Consults: Dr. Toledo, Dr. SERG Briones    Significant Diagnostic Studies:   CXR:  1.  Right paratracheal and right hilar masses are again noted for which a malignant etiology is possible. If this patient has not undergone prior CT, contrast enhanced CT is recommended.  2. Interval development of alveolar opacity in the right upper lobe consistent with pneumonia, possibly post obstructive in nature.     CXR: Right paratracheal /mediastinal and hilar mass corresponding is seen on prior study and an hazy opacification peripherally in the right upper lobe which could represent post obstructive changes well as mass are not significantly changed compared to prior exam.  There is very thin curvy linear crescent-shaped lucency peripherally along the right lateral chest wall apical region possibly very tiny pneumothorax.  Suggest followup study.    ECHO:    1 - Normal left ventricular systolic function (EF 60-65%).     2 - No wall motion abnormalities.     3 - The estimated PA systolic pressure is 24 mmHg.     4 - Trivial mitral regurgitation.     Microbiology Results (last 7 days)     Procedure Component Value Units Date/Time    Urine culture [[id number]] Collected:  08/07/17 1042    Order Status:  Completed Specimen:  Urine from Urine, Clean Catch Updated:  08/09/17 0041     Urine Culture, Routine No growth    Culture, Respiratory with Gram Stain [[id number]]     Order Status:  No result Specimen:  Respiratory         Special Treatments/Procedures: None  Disposition: Home or Self Care     Medications:  Reconciled Home Medications: Current Discharge Medication List      START taking these medications    Details   amoxicillin-clavulanate 875-125mg (AUGMENTIN) 875-125 mg per tablet Take 1 tablet by  mouth 2 (two) times daily.  Qty: 14 tablet, Refills: 0         CONTINUE these medications which have CHANGED    Details   levoFLOXacin (LEVAQUIN) 500 MG tablet Take 1 tablet (500 mg total) by mouth once daily.  Qty: 7 tablet, Refills: 0    Associated Diagnoses: Mass of upper lobe of right lung; Nausea and vomiting, intractability of vomiting not specified, unspecified vomiting type; Fever, unspecified fever cause         CONTINUE these medications which have NOT CHANGED    Details   albuterol 90 mcg/actuation inhaler 2 puffs every 4 hours as needed for cough, wheeze, or shortness of breath  Qty: 1 Inhaler, Refills: 11    Associated Diagnoses: COPD exacerbation      albuterol-ipratropium 2.5mg-0.5mg/3mL (DUO-NEB) 0.5 mg-3 mg(2.5 mg base)/3 mL nebulizer solution Take 3 mLs by nebulization every 6 (six) hours as needed.  Qty: 120 vial, Refills: 11    Associated Diagnoses: Chronic cough      alprazolam (XANAX) 0.25 MG tablet Take 1 tablet (0.25 mg total) by mouth 3 (three) times daily as needed for Anxiety.  Qty: 90 tablet, Refills: 2    Associated Diagnoses: Anxiety      fluticasone-vilanterol (BREO ELLIPTA) 200-25 mcg/dose DsDv diskus inhaler Inhale 1 puff into the lungs once daily.  Qty: 1 each, Refills: 11    Associated Diagnoses: COPD exacerbation      guaifenesin-codeine 100-10 mg/5 ml (TUSSI-ORGANIDIN NR)  mg/5 mL syrup Take 10 mLs by mouth every 4 (four) hours as needed for Cough.  Qty: 473 mL, Refills: 1      hydrocodone-acetaminophen 5-325mg (NORCO) 5-325 mg per tablet Take 1 tablet by mouth every 4 (four) hours as needed (cough).  Qty: 150 tablet, Refills: 0    Comments: Fill one month after last norco script - should be July 8, 2017  Associated Diagnoses: Lymphadenopathy, mediastinal; Mass of upper lobe of right lung; Chronic cough      predniSONE (DELTASONE) 20 MG tablet Take 2 daily for 3 days then One daily for 3 days and repeat for flare of lung symptoms as intructed  Qty: 24 tablet, Refills: 0     Associated Diagnoses: COPD exacerbation      promethazine (PHENERGAN) 12.5 MG Tab Take 1 tablet (12.5 mg total) by mouth every 4 (four) hours as needed (nausea).  Qty: 60 tablet, Refills: 0    Associated Diagnoses: Mass of upper lobe of right lung; Nausea and vomiting, intractability of vomiting not specified, unspecified vomiting type         STOP taking these medications       sulfamethoxazole-trimethoprim 800-160mg (BACTRIM DS) 800-160 mg Tab Comments:   Reason for Stopping:               Discharge Procedure Orders  Diet general   Order Comments: Cardiac/ 2 gram sodium low cholesterol diet  Fluid restriction 1.2 L/day     Other restrictions (specify):   Order Comments: Fall precautions     Call MD for:   Order Comments: For worsening symptoms, chest pain, shortness of breath, increased abdominal pain, high grade fever, stroke or stroke like symptoms, immediately go to the nearest Emergency Room or call 911 as soon as possible.       Follow-up Information     Ricci Guerra MD In 1 week.    Specialty:  Family Medicine  Contact information:  [address]  [facility] [address]  [phone]             Steve Toledo MD In 2 weeks.    Specialty:  Pulmonary Disease  Contact information:  [address] [facility]  [address]  [address]  [place] [address]  [phone]             CHANI Briones MD In 1 week.    Specialties:  Hematology, Oncology, Hematology and Oncology  Contact information:  [address] [facility]  [address]  [place] [address] [phone]             Please follow up.    Contact information:  Please do not smoke.           Please follow up.    Contact information:  Get BMP checked in 7 days

## 2017-08-10 NOTE — PLAN OF CARE
08/10/17 1421   Final Note   Assessment Type Final Discharge Note   Discharge Disposition Home   Discharge planning education complete? Yes

## 2017-08-14 ENCOUNTER — OFFICE VISIT (OUTPATIENT)
Dept: HEMATOLOGY/ONCOLOGY | Facility: CLINIC | Age: 52
End: 2017-08-14
Payer: COMMERCIAL

## 2017-08-14 VITALS
HEART RATE: 93 BPM | BODY MASS INDEX: 28.2 KG/M2 | WEIGHT: 144.38 LBS | SYSTOLIC BLOOD PRESSURE: 164 MMHG | RESPIRATION RATE: 18 BRPM | DIASTOLIC BLOOD PRESSURE: 96 MMHG | TEMPERATURE: 98 F

## 2017-08-14 DIAGNOSIS — T45.1X5A CHEMOTHERAPY-INDUCED NEUTROPENIA: Primary | ICD-10-CM

## 2017-08-14 DIAGNOSIS — D70.1 CHEMOTHERAPY-INDUCED NEUTROPENIA: Primary | ICD-10-CM

## 2017-08-14 PROCEDURE — 3008F BODY MASS INDEX DOCD: CPT | Mod: ,,, | Performed by: INTERNAL MEDICINE

## 2017-08-14 PROCEDURE — 99214 OFFICE O/P EST MOD 30 MIN: CPT | Mod: ,,, | Performed by: INTERNAL MEDICINE

## 2017-08-14 PROCEDURE — 1111F DSCHRG MED/CURRENT MED MERGE: CPT | Mod: ,,, | Performed by: INTERNAL MEDICINE

## 2017-08-15 ENCOUNTER — TELEPHONE (OUTPATIENT)
Dept: HEMATOLOGY/ONCOLOGY | Facility: CLINIC | Age: 52
End: 2017-08-15

## 2017-08-15 DIAGNOSIS — C34.11 MALIGNANT NEOPLASM OF UPPER LOBE OF RIGHT LUNG: Primary | ICD-10-CM

## 2017-08-15 PROBLEM — T45.1X5A CHEMOTHERAPY-INDUCED NEUTROPENIA: Status: ACTIVE | Noted: 2017-08-15

## 2017-08-15 PROBLEM — D70.1 CHEMOTHERAPY-INDUCED NEUTROPENIA: Status: ACTIVE | Noted: 2017-08-15

## 2017-08-15 RX ORDER — SODIUM CHLORIDE 0.9 % (FLUSH) 0.9 %
10 SYRINGE (ML) INJECTION
Status: CANCELLED | OUTPATIENT
Start: 2017-08-23

## 2017-08-15 RX ORDER — ACETAMINOPHEN 325 MG/1
650 TABLET ORAL ONCE
Status: CANCELLED
Start: 2017-08-22

## 2017-08-15 RX ORDER — HEPARIN 100 UNIT/ML
500 SYRINGE INTRAVENOUS
Status: CANCELLED | OUTPATIENT
Start: 2017-08-22

## 2017-08-15 RX ORDER — SODIUM CHLORIDE 0.9 % (FLUSH) 0.9 %
10 SYRINGE (ML) INJECTION
Status: CANCELLED | OUTPATIENT
Start: 2017-08-22

## 2017-08-15 RX ORDER — HEPARIN 100 UNIT/ML
500 SYRINGE INTRAVENOUS
Status: CANCELLED | OUTPATIENT
Start: 2017-08-21

## 2017-08-15 RX ORDER — ACETAMINOPHEN 325 MG/1
650 TABLET ORAL ONCE
Status: CANCELLED
Start: 2017-08-23

## 2017-08-15 RX ORDER — ACETAMINOPHEN 325 MG/1
650 TABLET ORAL ONCE
Status: CANCELLED
Start: 2017-08-21

## 2017-08-15 RX ORDER — SODIUM CHLORIDE 0.9 % (FLUSH) 0.9 %
10 SYRINGE (ML) INJECTION
Status: CANCELLED | OUTPATIENT
Start: 2017-08-21

## 2017-08-15 RX ORDER — HEPARIN 100 UNIT/ML
500 SYRINGE INTRAVENOUS
Status: CANCELLED | OUTPATIENT
Start: 2017-08-23

## 2017-08-15 NOTE — PROGRESS NOTES
[facility] History & Physical    Subjective:      Patient ID:   Maile Light  51 y.o. female  1965                                                                                 MD Steve Jennings MD      Chief Complaint:   RLL lung mass    HPI:  51 y.o. female with diagnosis of  Evaluation for elective breast reduction.  Found to have  Na 116-118.  Seen in ER, mass RLL.  SOB +, non productive cough.  No hemoptysis.  Appetite and weight stable.  Smoke 1 ppd x's 25 years.    She saw Dr. Toledo.  Bronchoscopy positve for tumor cells, likely small cell cancer, not clearly  Diagnostic.  Dr. Martinez for mediastinoscopy and  bx and tissue dx.  Preliminary report Small cell cancer of lung, final report pending.    She c/o headaches and nausea, MRI of head was negative for metastases.  She does not  aggree to repeat MRI, r/o metastases.  Norco and zofran written.    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  Try to restrict po fluids to 1,000cc daily  To help with Na management.    HTN, LBP,hernia repair x's 7, mesh in place.  She wants breast reduction.    Allergy no.  Job micah,     Extensive abdomenal surgery, colostomy reversal, Dr. Eugene Parrish,   [facility], 3 years ago.  Lumbar back surgery x's 1.    Mom Ca ? Type., paralysis.  Dad GI cancer, colostomy.  10 Br and Sist, heart dx, renal dx, MVA death.    ROS:   GEN: normal without any fever, night sweats or weight loss  HEENT: normal with no HA's, sore throat, stiff neck, changes in vision  CV: normal with no CP, SOB, PND, DE OLIVEIRA or orthopnea  PULM: See HPI.   no SOB, cough, hemoptysis, sputum or pleuritic pain  GI: See HPI.   no abdominal pain, nausea, vomiting, constipation, diarrhea, melanotic stools, BRBPR, or hematemesis  : normal with no hematuria, dysuria  BREAST: normal with no mass, discharge, pain  SKIN: normal with no rash, erythema, bruising, or swelling       Social History     Social History     Marital status:      Spouse name: N/A    Number of children: N/A    Years of education: N/A     Occupational History    Not on file.     Social History Main Topics    Smoking status: Current Every Day Smoker     Packs/day: 0.25     Years: 25.00     Types: Cigarettes    Smokeless tobacco: Never Used    Alcohol use Yes      Comment: social    Drug use: No    Sexual activity: No     Other Topics Concern    Not on file     Social History Narrative    No narrative on file         Current Outpatient Prescriptions:     albuterol 90 mcg/actuation inhaler, 2 puffs every 4 hours as needed for cough, wheeze, or shortness of breath, Disp: 1 Inhaler, Rfl: 11    albuterol-ipratropium 2.5mg-0.5mg/3mL (DUO-NEB) 0.5 mg-3 mg(2.5 mg base)/3 mL nebulizer solution, Take 3 mLs by nebulization every 6 (six) hours as needed., Disp: 120 vial, Rfl: 11    alprazolam (XANAX) 0.25 MG tablet, Take 1 tablet (0.25 mg total) by mouth 3 (three) times daily as needed for Anxiety., Disp: 90 tablet, Rfl: 2    amoxicillin-clavulanate 875-125mg (AUGMENTIN) 875-125 mg per tablet, Take 1 tablet by mouth 2 (two) times daily., Disp: 14 tablet, Rfl: 0    fluticasone-vilanterol (BREO ELLIPTA) 200-25 mcg/dose DsDv diskus inhaler, Inhale 1 puff into the lungs once daily., Disp: 1 each, Rfl: 11    guaifenesin-codeine 100-10 mg/5 ml (TUSSI-ORGANIDIN NR)  mg/5 mL syrup, Take 10 mLs by mouth every 4 (four) hours as needed for Cough., Disp: 473 mL, Rfl: 1    hydrocodone-acetaminophen 5-325mg (NORCO) 5-325 mg per tablet, Take 1 tablet by mouth every 4 (four) hours as needed (cough). (Patient taking differently: Take 2 tablets by mouth every 4 (four) hours as needed (cough). ), Disp: 150 tablet, Rfl: 0    levoFLOXacin (LEVAQUIN) 500 MG tablet, Take 1 tablet (500 mg total) by mouth once daily., Disp: 7 tablet, Rfl: 0    predniSONE (DELTASONE) 20 MG tablet, Take 2 daily for 3 days then One daily for 3 days and repeat for flare of lung  symptoms as intructed, Disp: 24 tablet, Rfl: 0    promethazine (PHENERGAN) 12.5 MG Tab, Take 1 tablet (12.5 mg total) by mouth every 4 (four) hours as needed (nausea)., Disp: 60 tablet, Rfl: 0          Objective:   Vitals:  Blood pressure (!) 164/96, pulse 93, temperature 98.1 °F (36.7 °C), temperature source Oral, resp. rate 18, weight 65.5 kg (144 lb 6.4 oz).    Physical Examination:   GEN: no apparent distress, comfortable; AAOx3  HEAD: atraumatic and normocephalic  EYES: no pallor, no icterus, PERRLA  ENT: no swelling or CN palsy  NECK: neck incision site above sternum closed, healing, tender  CV: RRR with no murmur; normal pulse  CHEST: Normal respiratory effort; CTAB; normal breath sounds; no wheeze or crackles  ABDOM: nontender and nondistended; soft; normal bowel sounds; no rebound/guarding  MUSC/Skeletal: ROM normal; no crepitus; joints normal  EXTREM: no clubbing, cyanosis, inflammation or swelling, no edema or calf tenderness  SKIN:  sebaceous cyst lanced and draining, (Dr. Toledo)  : no sousa  NEURO: grossly intact; motor/sensory WNL; AAOx3; no tremors  PSYCH: normal mood, affect and behavior  LYMPH: normal cervical, supraclavicular, axillary and groin LN's  Breasts: NT, no palpable mass at L or R breast    I have reviewed all available lab results and radiology reports.    Na 116-118.     Radiology/Diagnostic Studies:    CAT RLL mass, ? RML nodule, increased hilar and mediastinal LN.      All lab results and imaging results have been reviewed and discussed with the patient    Assessment:   (1) 51 y.o. female with diagnosis of hyponatremia, SIADH likely.  Fluid restriction of 1,000cc daily.    (2)RLL mass and local mediastinoscopy, likely Small cell lung Cancer?      Final report pending.    (3)PET shows hypermetabolic RUL mass, R mediastinal LN and L supraclavicular LN.  No distant metastases to liver, bones, brain.  MRI of brain no metastatic disease seen.    She has HA and nausea, she does not aggree  "to repeat MRI to r/o metastases.  Zofran and Norco given Rx.    (6)Treatment options could include chemotherapy and radiation concurrently or sequentially.  My plan is to go with Carboplatin, -16 initially.  After bulky LN has decreased and SIADH better, will try to go with   Cisplatin, -16 and Rad Rx concurrently or sequentially.    Discussed treatment schema with her, benefit potential, toxicity, side effects and alternative chemo options.  Discussed following CBC weekly, Na, and neulasta support.  5'0"  144#.  D1C1 8/21,22,23.  RTC 2 weeks.    (7)Chronic low back pain. Old MRI 8/2016 showed disc dx.    Plan:     I have explained and the patient understands all of  the current recommendation(s). I have answered all of their questions to the best of my ability and to their complete satisfaction.         "

## 2017-08-16 NOTE — TELEPHONE ENCOUNTER
----- Message from Katelyn Huff sent at 8/16/2017 10:27 AM CDT -----  Contact: call [phone]   Pt called said dr jones gave her norco and they are not working said the hospital gave her oxycodone and that works better asking for rx for oxycodone. Call back # above

## 2017-08-17 NOTE — PHYSICIAN QUERY
"PT Name: Maile Light  MR #: [id number]    Physician Query Form - Hematology Clarification      CDS/: Eli Velez               Contact information:[email or web address]    This form is a permanent document in the medical record.      Query Date: August 17, 2017    By submitting this query, we are merely seeking further clarification of documentation. Please utilize your independent clinical judgment when addressing the question(s) below.    The Medical record contains the following:   Indicators  Supporting Clinical Findings Location in Medical Record   X "Anemia" documented 3. Anemia- She is becoming more anemic- will trend. Progress notes, consult   X H & H =  Hemoglobin 12.0 - 16.0 g/dL 10.9  12.7 13.1    Hematocrit 37.0 - 48.5 % 31.7         Lab report                            Treatment:     X Other:  Lung cancer,Microscopic Hematuria- Noted on prior 2 urine samples- there is no proteinuria. Awaiting urine culture. Progress notes, consult     Provider, please specify diagnosis or diagnoses for the anemia that is documented in the chart.    [  ] Acute blood loss anemia  [  ] Anemia of chronic disease ( Specify chronic disease)   [  ] Neoplastic disease   [  ] Other (Specify) _______________________________  [ x ] Clinically Undetermined   [  ] Other Hematological Diagnosis (please specify): _________________________________      Please document in your progress notes daily for the duration of treatment, until resolved, and include in your discharge summary.                                                                                                      "

## 2017-08-21 ENCOUNTER — CLINICAL SUPPORT (OUTPATIENT)
Dept: HEMATOLOGY/ONCOLOGY | Facility: CLINIC | Age: 52
End: 2017-08-21
Payer: MEDICAID

## 2017-08-21 ENCOUNTER — DOCUMENTATION ONLY (OUTPATIENT)
Dept: RADIATION ONCOLOGY | Facility: CLINIC | Age: 52
End: 2017-08-21

## 2017-08-21 NOTE — PROGRESS NOTES
I met with patient and her  to complete New Patient Orientation and distress screening.  She indicated a distress rating of 10; she is currently prescribed Xanax for anxiety but did not remember the dosage.  She does not wish so pursue counseling at this time; I provided her my contact information in the event she changes her mind.  I provided her supportive services and information for newly diagnosed patients.  I also provided her with $20 in gas cards and a [employer] gift card.

## 2017-08-21 NOTE — PROGRESS NOTES
Nutrition Note  Jo Ann is a 51 year-old female with small cell lung cancer getting Etoposide and Carboplatin.  Weight: 145 lbs. She denies any recent weight loss, but has a sore throat due to intubation during surgery.  She needs soft foods.  She also has noticed that food tastes exceptionally spicy. She had hyponatremia, this has improved with fluid restriction and  being on Tolvaptan. Na: 135 (8/8).  She was very emotional during our visit.  Plan: Discussed importance of nutrition during cancer treatment. 2. Advised she continue to follow MD's advice regarding fluid restriction.  Encouraged her to continue fluid restriction of 1.5 liter = 6-7 cups. (nephrologist discharge recommendation).  Advised she ask nephrologist/oncologist when she is able to increase. Her  was limiting her fluids to 4 cups daily and she was extremely thirsty.  I advised it was okay to increase to 6-7 ups daily.  Reviewed other liquid containing foods to restrict. 3. Educated on Fiber One and Senokot-S protocol for constipation. 4. Gave information on food safety and discouraged use of vitamin and herbal supplements. 5. Advised that if she need samples of nutrition supplements/coupons to contact me.

## 2017-08-21 NOTE — PROGRESS NOTES
Maile Light  [id number]    [facility]   Cancer Center    TITLE: PLAN OF CARE FOR THE CHEMOTHERAPY PATIENT / TEACHING PROTOCOL    PURPOSE: To involve the patient / significant other in the plan of care and to provide teaching to the significant other & patient receiving chemotherapy.    LEVEL: Independent.    CONTENT: The Plan of Care for the chemotherapy patient is individualized and appropriate to the patients needs, strengths, limitations, & goals.  Education includes information regarding chemotherapy side effects, the treatment itself, and self-care  Activities.    GOAL / OUTCOME STANDARDS    PHYSIOLOGIC: The client will remain free or experience minimal side effects or toxicities throughout the chemotherapy treatment period.     PSYCHOLOGIC: The client/significant others will demonstrate positive coping mechanisms in relation to chemotherapy and its side effects.      COGINITIVE: The client/significant others will verbalize understanding of self-care measure to avoid/minimize side effects of the chemotherapy regime.    EVALUATION / COMMENT KEY:    V = Audiovisual/Video  S = Successfully meets outcome  N = Needs further instruction  NA = Not applicable to the patient  P = Previous knowledge  U = Unable to comprehend  * = See progress notes          PLAN OF CARE  INFORMATION TO BE DELIVERED / NURSING INTERVENTIONS DATE EVALUATION   1. Assessment of client/caregiver,         knowledge of cancer diagnosis,         and chemotherapy as a treatment. 1a. Evaluate patient/caregiver learning ability    b. Plan teaching sessions with patient/caregiver according to needs and present anxiety level/ability to learn.    c. Provide Chemotherapy Education Packet,        Mouth Care Protocol,         Specific Patient Education Sheets. 08/21/2017 S   2. Individual chemotherapy treatment         plan. 2a. Review of Chemotherapy Education handout from [facility]            08/21/2017   S   3. Knowledge Deficit &  Self-Management of general side effects common to all chemotherapy:  a. Nausea/Vomiting  b.   Diarrhea  c. Mouth Care  d. Dental care  e. Constipation  f. Hair Loss  g. Potential for infection  h. Fatigue   3a. Reinforce that the majority of side effects from chemotherapy are reversible and are  controlled both in the hospital and at home        (blood counts recover, hair grows back).   b.  Refer to the following for reinforcement of         information post-treatment:  1. Mouth Care Protocol.  2. Bowel Protocol for constipation or diarrhea.  3.  Drug Specific Chemotherapy Information Sheets for each medication patient receiving.    08/21/2017     S     PLAN OF CARE  INFORMATION TO BE DELIVERED / NURSING INTERVENTIONS DATE EVALUATION   h. Potential for bleeding         i. Potential anemia/fatigue         j. Potential sunburn         k. Birth control measures  l. Safety measures post treatment 4.  Chemotherapy Home Care Instruction  and Safety Information Sheet.  A. patient/caregivers to thoroughly cook shellfish (shrimp, crab, etc) to decrease the chance of infection.    B.  Use sunscreen and protective clothing while in the sun.   08/21/2017      4. Knowledge deficit & Self Management of Drug Specific  Side Effects.    a. BLADDER EFFECTS        (Hemorrhagic Cystitis)                  Preventable with adequate hydration; occurs 2-3 days or more post treatment.   1.  Instruct patient to:  a.   Void at least every 2 hours; increase intake.  b.   DO NOT hold urine; go when urge is felt.  c.    Empty bladder at bedtime and on         awakening.  d.   Observe for color changes (red to tea           colored), amount and frequency changes.  e.   Notify oncologist of any abnormalities           in urine or voiding or if you cannot               drink adequate fluids.   08/21/2017   S   b.   CHANGES IN URINE        COLOR:      1.   Instruct patient:  a.   Most evident in first 2-3 voidings after            administration.  b. Lasts less than 24 hours.  c. If urine is discolored 2 or more days post- treatment, notify oncologist.      08/21/2017 S   c.    KIDNEY EFFECTS           (Nephrotoxicity)   1.  Instruct patient to:  a.   Drink 8-16 glasses of fluid/day the day   pre-treatment and 3-4 days post-treatment to maintain hydration; the best way to minimize kidney problems.  b.   Notify oncologist immediately if unable to drink fluids or if changes are noted in urinary elimination.     08/21/2017   S   a. PULMONARY TOXICITY    1. Instruct patient to report symptoms such as shortness of breath, chest pain, shallow breathing, or chest wall discomfort to physician.  2. Reinforce preventative measures used by the health care team.  a. Baseline and periodic PFT and chest x-ray.   08/21/2017   S     PLAN OF CARE INFORMATION TO BE DELIVERED / NURSING INTERVENTIONS DATE EVALUATION   b. NERVE & MUSCLE EFFECTS (neurotoxocity; neuropathy, possible visual/hearing changes)        3. Instruct patient to:    a. Report numbness or tingling of the hands/feet, loss of fine motor movement (buttoning shirt, tying shoelaces), or gait changes to your oncologist.  b. If numbness/tingling are present:  1. protect feet with shoes at all times.  2. Use gloves for washing dishes/gardening & potholders in kitchen.       08/21/2017   S   c. CARDIOTOXICITY  Decreased effectiveness of             cardiac function. Effective are                  cumulative and irreversible.                                    CARDIAC ARRYTHMIAS              4   Instruct:  a. Heart function may be tested before treatment and perdiocally during treatment.  b. Notify oncologist of irregular pulse, palpitations, shortness of breath, or swelling in lower extremities/feet.          Taxol and Taxotere can cause arrhythmias on infusion that resolve once infusion discontinued. Instruct nurse if any irregularity felt.    08/21/2017   S   d. EXTRAVASTION  Occurs when vesicants  leak outside of vein and cause damage to the skin and underlying tissues.   1. Reinforce preventive measures used to avoid complications.  a. Fresh IV site or central line monitored continuously with vesicant IVP.  b. Continuous infusion via central line site and blood return monitored periodically around the clock.  2. Instruct to:  a. Notify nurse of any discomfort, burning, stinging, etc. at IV site during chemotherapy administration.  b. Notify oncologist of any redness, pain, or swelling at IV site after discharge from hospital.   08/21/2017   S   e. HYPERSENSITIVITY can happen with any medication.   1. Instruct patient:  a. Nurse is with them during the initial part of treatment and will be close by to monitor.  b. Pre-medication ordered by the oncologist must be taken on time. If doses are missed, treatment will need to be re-scheduled.  c. Skin redness, itching, or hives appearing after discharge should be reported to oncologist. 08/21/2017   S       PLAN OF CARE INFORMATION TO BE DELIVERED / NURSING INTERVENTIONS DATE EVALUATION   f. FLU-LIKE SYNDROME      1. Instruct patient symptoms are hard to prevent and may include fever, shaking chills, muscle and body aches.  a. Taking prescribed medications from physician if needed.  b. Adequate fluids are important.    2. Reinforce the need to call if temperature is         elevated to 100.4 or more  08/21/2017   S   g. HAND-FOOT SYNDROME  causes painful, symmetric swelling and redness of palms and soles                  5. Instruct patient to report any numbness or tingling in the hands or feet.  6. Explain prevention techniques, such as     a. Use heavy moisturizers to lessen skin dryness and itching, but to avoid if skin is cracked or broken  b. Bathe in tepid water, use non-perfumed soap, and wash gently. Baths with oatmeal or diluted baking soda may be soothing.  c. Avoid tight fitting shoes and repetitive actions, such as rubbing hands or applying pressure to  hands/feet.  7. Review measures to take should syndrome occur:  a. Cold compresses and elevation for          edema  b. Pain medications and other measures as ordered by oncologist.   4.   Syndrome resolves few weeks after therapy. 08/21/2017   S   5. DISCHARGE PLANNING /        EDUCATION 1.    Explain importance of compliance with follow- up  tests (CBC, CMP).  2.    Verify patient/caregiver know:  a.    Oncologists office phone number.  b.    Dates of follow-up appointments.  c.    Prescriptions given for nausea  3.   Review side effects to monitor and notify          oncologist about.  4.   Reinforce the need for patient and caregivers to:  a.    Review information given.  b.    Call oncologists office with questions          or symptoms  5.   Provide [facility]chure make referrals if needed for financial or .   08/21/2017   S     PROGRESS NOTES: I met with the patient and  today for chemotherapy education. she will be starting treatment today with Etoposide and Carboplatin . We discussed the mechanism of action, potential side effects of this treatment as well as ways she can manage them at home. Some of these side effects include but or not limited to fever, nausea, vomiting, decreased appetite, fatigue, weakness, cytopenias, myalgia/arthralgia, constipation, diarrhea, bleeding, headache, shortness of breath, nail changes, taste change, hair thinning/loss, mood disturbances, or edema. We also discussed dietary modifications she should make although this will be discussed in more detail with the dietician. she was provided with anti-emetic medication by Dr. Briones, a copy of all of the information we discussed today as well as our contact information. she will be provided a schedule today while in infusion.  Patient states that she is not sure about taking this chemotherapy.  Patient states that if she is going to die, why take the treatment.  Explained to patient the  importance of chemotherapy for increasing her survival time.  Also explained to the patient that it was her decision to take chemotherapy and that if she did not want to take the treatment, she did not have to today and we would speak to Dr. Rodriguez.  She states that she would like to have treatment today and that she would just wait to see how it made her feel.   We will obtain labs on a weekly basis and the patient will follow-up with the physician for toxicity monitoring throughout treatment. All questions were answered and an informed consent was obtained. she was reminded to certainly contact us sooner if needed.

## 2017-08-23 ENCOUNTER — SOCIAL WORK (OUTPATIENT)
Dept: HEMATOLOGY/ONCOLOGY | Facility: CLINIC | Age: 52
End: 2017-08-23

## 2017-08-23 ENCOUNTER — TELEPHONE (OUTPATIENT)
Dept: HEMATOLOGY/ONCOLOGY | Facility: CLINIC | Age: 52
End: 2017-08-23

## 2017-08-23 DIAGNOSIS — R11.2 NAUSEA AND VOMITING, INTRACTABILITY OF VOMITING NOT SPECIFIED, UNSPECIFIED VOMITING TYPE: ICD-10-CM

## 2017-08-23 RX ORDER — HEPARIN SODIUM (PORCINE) LOCK FLUSH IV SOLN 100 UNIT/ML 100 UNIT/ML
100 SOLUTION INTRAVENOUS
Status: CANCELLED | OUTPATIENT
Start: 2017-08-23

## 2017-08-23 RX ORDER — SODIUM CHLORIDE 0.9 % (FLUSH) 0.9 %
10 SYRINGE (ML) INJECTION
Status: CANCELLED | OUTPATIENT
Start: 2017-08-23

## 2017-08-23 RX ORDER — PROMETHAZINE HYDROCHLORIDE 25 MG/ML
25 INJECTION, SOLUTION INTRAMUSCULAR; INTRAVENOUS ONCE
Status: CANCELLED | OUTPATIENT
Start: 2017-08-23 | End: 2017-08-23

## 2017-08-23 NOTE — TELEPHONE ENCOUNTER
Pt in infusion for C1D3 of Carbo/VP16/ Christen pts nurse reports pt is highly anxious with C/Os of nausea and vomiting that Phenergan and Zofran not helping. Dr. Brewster ordered IVFs and IV Phenergan to help manage this today. Instructed nurse that PMD has her on Xanax TID she should make an appt. To see Dr. Guerra for eval of anxiety and maybe something for depression as she reports wanting to cry all the time. RN will instruct to pt.

## 2017-08-23 NOTE — PROGRESS NOTES
Met with patient on 08/22/17 while she was in infusion to check on her due to her high distress that was indicated on 08/21/17 while completing New Patient Orientation.  I provided supportive services and she participated in Art Therapy with Sasha Coburn, Art Therapist, who provided additional supportive services.

## 2017-08-29 ENCOUNTER — OFFICE VISIT (OUTPATIENT)
Dept: HEMATOLOGY/ONCOLOGY | Facility: CLINIC | Age: 52
End: 2017-08-29
Payer: COMMERCIAL

## 2017-08-29 VITALS
HEART RATE: 91 BPM | WEIGHT: 141 LBS | BODY MASS INDEX: 27.54 KG/M2 | RESPIRATION RATE: 18 BRPM | TEMPERATURE: 98 F | SYSTOLIC BLOOD PRESSURE: 121 MMHG | DIASTOLIC BLOOD PRESSURE: 74 MMHG

## 2017-08-29 DIAGNOSIS — R91.8 LUNG MASS: Primary | ICD-10-CM

## 2017-08-29 DIAGNOSIS — T45.1X5A CHEMOTHERAPY-INDUCED NEUTROPENIA: ICD-10-CM

## 2017-08-29 DIAGNOSIS — D70.1 CHEMOTHERAPY-INDUCED NEUTROPENIA: ICD-10-CM

## 2017-08-29 PROCEDURE — 1111F DSCHRG MED/CURRENT MED MERGE: CPT | Mod: ,,, | Performed by: INTERNAL MEDICINE

## 2017-08-29 PROCEDURE — 99213 OFFICE O/P EST LOW 20 MIN: CPT | Mod: ,,, | Performed by: INTERNAL MEDICINE

## 2017-08-29 PROCEDURE — 3008F BODY MASS INDEX DOCD: CPT | Mod: ,,, | Performed by: INTERNAL MEDICINE

## 2017-08-29 RX ORDER — ALPRAZOLAM 0.25 MG/1
TABLET ORAL
COMMUNITY
Start: 2017-08-21 | End: 2017-09-28 | Stop reason: SDUPTHER

## 2017-08-29 RX ORDER — ONDANSETRON HYDROCHLORIDE 8 MG/1
TABLET, FILM COATED ORAL
COMMUNITY
Start: 2017-08-14

## 2017-08-29 NOTE — LETTER
August 31, 2017      Steve Toledo MD  [address]  [address]  [address]  [place] [address]           [facility]  [address]  [address]  [place] [address]  Phone: [phone]  Fax: [phone]          Patient: Maile Light   MR Number: [id number]   YOB: 1965   Date of Visit: 8/29/2017       Dear Dr. Steve Toledo:    Thank you for referring Maile Light to me for evaluation. Attached you will find relevant portions of my assessment and plan of care.    If you have questions, please do not hesitate to call me. I look forward to following Maile Light along with you.    Sincerely,    Maggi Samuel  CC:  No Recipients    If you would like to receive this communication electronically, please contact [email or web address] or [phone] to request more information on [employer] access.    For providers and/or their staff who would like to refer a patient to [facility], please contact us through our one-stop-shop provider referral line, [facility], at [phone].    If you feel you have received this communication in error or would no longer like to receive these types of communications, please e-mail [email or web address]

## 2017-09-05 LAB
ACID FAST MOD KINY STN SPEC: NORMAL
MYCOBACTERIUM SPEC QL CULT: NORMAL

## 2017-09-05 NOTE — PROGRESS NOTES
[facility] History & Physical    Subjective:      Patient ID:   Maile Light  51 y.o. female  1965                                                                                 MD Steve Jennings MD      Chief Complaint:   RLL lung mass    HPI:  51 y.o. female with diagnosis of  Evaluation for elective breast reduction.  Found to have  Na 116-118.  Seen in ER, mass RLL.  SOB +, non productive cough.  No hemoptysis.  Appetite and weight stable.  Smoke 1 ppd x's 25 years.    She saw Dr. Toledo.  Bronchoscopy positve for tumor cells, likely small cell cancer, not clearly  Diagnostic.  Dr. Martinez for mediastinoscopy and  bx and tissue dx.  Preliminary report Small cell cancer of lung, final report pending.    She c/o headaches and nausea, MRI of head was negative for metastases.  She does not  aggree to repeat MRI, r/o metastases.  Norco and zofran written.    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  Try to restrict po fluids to 1,000cc daily  To help with Na management.    Further evaluation showed Small cell lung cancer,locally advanced dx.  S/P 1 cycle of Carboplatin, -16 x's 3 days.  S/P neulasta x's 1.    HTN, LBP,hernia repair x's 7, mesh in place.  She wants breast reduction.    Allergy no.  Job micah,     Extensive abdomenal surgery, colostomy reversal, Dr. Eugene Parrish,   [facility], 3 years ago.  Lumbar back surgery x's 1.    Mom Ca ? Type., paralysis.  Dad GI cancer, colostomy.  10 Br and Sist, heart dx, renal dx, MVA death.    ROS:   GEN: normal without any fever, night sweats or weight loss  HEENT: normal with no HA's, sore throat, stiff neck, changes in vision  CV: normal with no CP, SOB, PND, DE OLIVEIRA or orthopnea  PULM: See HPI.   no SOB, cough, hemoptysis, sputum or pleuritic pain  GI: See HPI.   no abdominal pain, nausea, vomiting, constipation, diarrhea, melanotic stools, BRBPR, or hematemesis  : normal with no hematuria, dysuria  BREAST:  normal with no mass, discharge, pain  SKIN: normal with no rash, erythema, bruising, or swelling       Social History     Social History    Marital status:      Spouse name: N/A    Number of children: N/A    Years of education: N/A     Occupational History    Not on file.     Social History Main Topics    Smoking status: Current Every Day Smoker     Packs/day: 0.25     Years: 25.00     Types: Cigarettes    Smokeless tobacco: Never Used    Alcohol use Yes      Comment: social    Drug use: No    Sexual activity: No     Other Topics Concern    Not on file     Social History Narrative    No narrative on file         Current Outpatient Prescriptions:     albuterol 90 mcg/actuation inhaler, 2 puffs every 4 hours as needed for cough, wheeze, or shortness of breath, Disp: 1 Inhaler, Rfl: 11    albuterol-ipratropium 2.5mg-0.5mg/3mL (DUO-NEB) 0.5 mg-3 mg(2.5 mg base)/3 mL nebulizer solution, Take 3 mLs by nebulization every 6 (six) hours as needed., Disp: 120 vial, Rfl: 11    alprazolam (XANAX) 0.25 MG tablet, , Disp: , Rfl:     fluticasone-vilanterol (BREO ELLIPTA) 200-25 mcg/dose DsDv diskus inhaler, Inhale 1 puff into the lungs once daily., Disp: 1 each, Rfl: 11    hydrocodone-acetaminophen 5-325mg (NORCO) 5-325 mg per tablet, Take 1 tablet by mouth every 4 (four) hours as needed (cough). (Patient taking differently: Take 2 tablets by mouth every 4 (four) hours as needed (cough). ), Disp: 150 tablet, Rfl: 0    ondansetron (ZOFRAN) 8 MG tablet, , Disp: , Rfl:     promethazine (PHENERGAN) 12.5 MG Tab, Take 1 tablet (12.5 mg total) by mouth every 4 (four) hours as needed (nausea)., Disp: 60 tablet, Rfl: 0    levoFLOXacin (LEVAQUIN) 500 MG tablet, Take 1 tablet (500 mg total) by mouth once daily., Disp: 7 tablet, Rfl: 0    predniSONE (DELTASONE) 20 MG tablet, Take 2 daily for 3 days then One daily for 3 days and repeat for flare of lung symptoms as intructed, Disp: 24 tablet, Rfl:  "0          Objective:   Vitals:  Blood pressure 121/74, pulse 91, temperature 97.6 °F (36.4 °C), resp. rate 18, weight 64 kg (141 lb).    Physical Examination:   GEN: no apparent distress, comfortable; AAOx3  HEAD: atraumatic and normocephalic  EYES: no pallor, no icterus, PERRLA  ENT: no swelling or CN palsy  NECK: neck incision site above sternum closed, healing, tender  CV: RRR with no murmur; normal pulse  CHEST: Normal respiratory effort; CTAB; normal breath sounds; no wheeze or crackles  ABDOM: nontender and nondistended; soft; normal bowel sounds; no rebound/guarding  MUSC/Skeletal: ROM normal; no crepitus; joints normal  EXTREM: no clubbing, cyanosis, inflammation or swelling, no edema or calf tenderness  SKIN:  sebaceous cyst lanced and draining, (Dr. Toledo)  : no sousa  NEURO: grossly intact; motor/sensory WNL; AAOx3; no tremors  PSYCH: normal mood, affect and behavior  LYMPH: normal cervical, supraclavicular, axillary and groin LN's  Breasts: NT, no palpable mass at L or R breast    I have reviewed all available lab results and radiology reports.    Na 116-118.     Radiology/Diagnostic Studies:    CAT RLL mass, ? RML nodule, increased hilar and mediastinal LN.      All lab results and imaging results have been reviewed and discussed with the patient    Assessment:   (1) 51 y.o. female with diagnosis of hyponatremia, SIADH likely.  Fluid restriction of 1,000cc daily.    (2)RLL mass and local mediastinoscopy,  Small cell lung Cancer?    (6)Treatment options could include chemotherapy and radiation concurrently or sequentially.  My plan is to go with Carboplatin, -16 initially.  After bulky LN has decreased and SIADH better, will try to go with   Cisplatin, -16 and Rad Rx concurrently or sequentially.    5'0"  144#.  D1C1 8/21,22,23.  RTC 2 weeks.    (7)Chronic low back pain. Old MRI 8/2016 showed disc dx.    Plan:           "

## 2017-09-07 ENCOUNTER — OFFICE VISIT (OUTPATIENT)
Dept: HEMATOLOGY/ONCOLOGY | Facility: CLINIC | Age: 52
End: 2017-09-07
Payer: COMMERCIAL

## 2017-09-07 VITALS
DIASTOLIC BLOOD PRESSURE: 88 MMHG | TEMPERATURE: 99 F | WEIGHT: 145 LBS | BODY MASS INDEX: 28.32 KG/M2 | SYSTOLIC BLOOD PRESSURE: 158 MMHG | HEART RATE: 76 BPM | RESPIRATION RATE: 18 BRPM

## 2017-09-07 DIAGNOSIS — E22.2 SIADH (SYNDROME OF INAPPROPRIATE ADH PRODUCTION): ICD-10-CM

## 2017-09-07 DIAGNOSIS — C34.11 MALIGNANT NEOPLASM OF UPPER LOBE OF RIGHT LUNG: ICD-10-CM

## 2017-09-07 DIAGNOSIS — R91.8 MASS OF UPPER LOBE OF RIGHT LUNG: Primary | ICD-10-CM

## 2017-09-07 PROCEDURE — 3008F BODY MASS INDEX DOCD: CPT | Mod: ,,, | Performed by: INTERNAL MEDICINE

## 2017-09-07 PROCEDURE — 1111F DSCHRG MED/CURRENT MED MERGE: CPT | Mod: ,,, | Performed by: INTERNAL MEDICINE

## 2017-09-07 PROCEDURE — 99213 OFFICE O/P EST LOW 20 MIN: CPT | Mod: ,,, | Performed by: INTERNAL MEDICINE

## 2017-09-08 ENCOUNTER — TELEPHONE (OUTPATIENT)
Dept: HEMATOLOGY/ONCOLOGY | Facility: CLINIC | Age: 52
End: 2017-09-08

## 2017-09-08 NOTE — TELEPHONE ENCOUNTER
----- Message from Miriam Moore sent at 9/8/2017  2:20 PM CDT -----  Patient would like to know if she is supposed to be doing chemo on Monday ? She had BW done this morning at [facility]. Please call patient at [phone]. Thanks

## 2017-09-08 NOTE — TELEPHONE ENCOUNTER
Spoke with pt after reviewing recent labs w/ Dr. Briones.  Pt is ok to have her chemo treatment on 9/11/17 per Dr. Briones.

## 2017-09-09 PROBLEM — C34.11 MALIGNANT NEOPLASM OF UPPER LOBE OF RIGHT LUNG: Status: ACTIVE | Noted: 2017-09-09

## 2017-09-11 ENCOUNTER — SOCIAL WORK (OUTPATIENT)
Dept: HEMATOLOGY/ONCOLOGY | Facility: CLINIC | Age: 52
End: 2017-09-11

## 2017-09-11 ENCOUNTER — HISTORICAL (OUTPATIENT)
Dept: ADMINISTRATIVE | Facility: HOSPITAL | Age: 52
End: 2017-09-11

## 2017-09-11 ENCOUNTER — TELEPHONE (OUTPATIENT)
Dept: HEMATOLOGY/ONCOLOGY | Facility: CLINIC | Age: 52
End: 2017-09-11

## 2017-09-11 LAB — PERFORMED BY:: NORMAL

## 2017-09-11 RX ORDER — SODIUM CHLORIDE 0.9 % (FLUSH) 0.9 %
10 SYRINGE (ML) INJECTION
Status: CANCELLED | OUTPATIENT
Start: 2017-09-11

## 2017-09-11 RX ORDER — HEPARIN 100 UNIT/ML
500 SYRINGE INTRAVENOUS
Status: CANCELLED | OUTPATIENT
Start: 2017-09-11

## 2017-09-11 RX ORDER — HEPARIN 100 UNIT/ML
500 SYRINGE INTRAVENOUS
Status: CANCELLED | OUTPATIENT
Start: 2017-09-13

## 2017-09-11 RX ORDER — ACETAMINOPHEN 325 MG/1
650 TABLET ORAL ONCE
Status: CANCELLED
Start: 2017-09-11

## 2017-09-11 RX ORDER — SODIUM CHLORIDE 0.9 % (FLUSH) 0.9 %
10 SYRINGE (ML) INJECTION
Status: CANCELLED | OUTPATIENT
Start: 2017-09-12

## 2017-09-11 RX ORDER — ACETAMINOPHEN 325 MG/1
650 TABLET ORAL ONCE
Status: CANCELLED
Start: 2017-09-12

## 2017-09-11 RX ORDER — ACETAMINOPHEN 325 MG/1
650 TABLET ORAL ONCE
Status: CANCELLED
Start: 2017-09-13

## 2017-09-11 RX ORDER — SODIUM CHLORIDE 0.9 % (FLUSH) 0.9 %
10 SYRINGE (ML) INJECTION
Status: CANCELLED | OUTPATIENT
Start: 2017-09-13

## 2017-09-11 RX ORDER — HEPARIN 100 UNIT/ML
500 SYRINGE INTRAVENOUS
Status: CANCELLED | OUTPATIENT
Start: 2017-09-12

## 2017-09-11 NOTE — PROGRESS NOTES
Met with patient while she was in infusion to follow-up with her due to her high distress rating during chemo school.  She still does not want to pursue outside counseling services at this time.  I provided her with supportive services and encouraged her to attend support groups when she can.  I will continue to follow-up with her.

## 2017-09-11 NOTE — PROGRESS NOTES
[facility] History & Physical    Subjective:      Patient ID:   Maile Light  51 y.o. female  1965                                                                                 MD Steve Jennings MD      Chief Complaint:   RLL lung mass    HPI:  51 y.o. female with diagnosis of  Evaluation for elective breast reduction.  Found to have  Na 116-118.  Seen in ER, mass RLL.  SOB +, non productive cough.  No hemoptysis.  Appetite and weight stable.  Smoke 1 ppd x's 25 years.    She saw Dr. Toledo.  Bronchoscopy positve for tumor cells, likely small cell cancer, not clearly  Diagnostic.  Dr. Martinez for mediastinoscopy and  bx and tissue dx.  Preliminary report Small cell cancer of lung, final report confirmed the diagnosis.she is status post first cycle of carboplatin and -16. Nausea and vomiting symptoms have resolved. She is beginning to have some alopecia. No change in cough or shortness of breath, thus far.    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  Try to restrict po fluids to 1,000cc daily  To help with Na management.  Sodium at this time is stable at 121.     Small cell lung cancer,locally advanced dx.  S/P 1 cycle of Carboplatin, -16 x's 3 days.  S/P neulasta x's 1.due for cycle #2 week, if blood counts are recovered.    HTN, LBP,hernia repair x's 7, mesh in place.  She wants breast reduction.    Allergy no.  Job obrien,     Extensive abdomenal surgery, colostomy reversal, Dr. Eugene Parrish,   [facility], 3 years ago.  Lumbar back surgery x's 1.    Mom Ca ? Type., paralysis.  Dad GI cancer, colostomy.  10 Br and Sist, heart dx, renal dx, MVA death.    ROS:   GEN: normal without any fever, night sweats or weight loss  HEENT: normal with no HA's, sore throat, stiff neck, changes in vision  CV: normal with no CP, SOB, PND, DE OLIVEIRA or orthopnea  PULM: See HPI.   no SOB, cough, hemoptysis, sputum or pleuritic pain  GI: See HPI.   no abdominal pain, nausea,  vomiting, constipation, diarrhea, melanotic stools, BRBPR, or hematemesis  : normal with no hematuria, dysuria  BREAST: normal with no mass, discharge, pain  SKIN: normal with no rash, erythema, bruising, or swelling       Social History     Social History    Marital status:      Spouse name: N/A    Number of children: N/A    Years of education: N/A     Occupational History    Not on file.     Social History Main Topics    Smoking status: Current Every Day Smoker     Packs/day: 0.25     Years: 25.00     Types: Cigarettes    Smokeless tobacco: Never Used    Alcohol use Yes      Comment: social    Drug use: No    Sexual activity: No     Other Topics Concern    Not on file     Social History Narrative    No narrative on file         Current Outpatient Prescriptions:     albuterol 90 mcg/actuation inhaler, 2 puffs every 4 hours as needed for cough, wheeze, or shortness of breath, Disp: 1 Inhaler, Rfl: 11    albuterol-ipratropium 2.5mg-0.5mg/3mL (DUO-NEB) 0.5 mg-3 mg(2.5 mg base)/3 mL nebulizer solution, Take 3 mLs by nebulization every 6 (six) hours as needed., Disp: 120 vial, Rfl: 11    alprazolam (XANAX) 0.25 MG tablet, , Disp: , Rfl:     fluticasone-vilanterol (BREO ELLIPTA) 200-25 mcg/dose DsDv diskus inhaler, Inhale 1 puff into the lungs once daily., Disp: 1 each, Rfl: 11    hydrocodone-acetaminophen 5-325mg (NORCO) 5-325 mg per tablet, Take 1 tablet by mouth every 4 (four) hours as needed (cough). (Patient taking differently: Take 2 tablets by mouth every 4 (four) hours as needed (cough). ), Disp: 150 tablet, Rfl: 0    levoFLOXacin (LEVAQUIN) 500 MG tablet, Take 1 tablet (500 mg total) by mouth once daily., Disp: 7 tablet, Rfl: 0    ondansetron (ZOFRAN) 8 MG tablet, , Disp: , Rfl:     predniSONE (DELTASONE) 20 MG tablet, Take 2 daily for 3 days then One daily for 3 days and repeat for flare of lung symptoms as intructed, Disp: 24 tablet, Rfl: 0    promethazine (PHENERGAN) 12.5 MG Tab,  Take 1 tablet (12.5 mg total) by mouth every 4 (four) hours as needed (nausea)., Disp: 60 tablet, Rfl: 0          Objective:   Vitals:  Blood pressure (!) 158/88, pulse 76, temperature 98.8 °F (37.1 °C), resp. rate 18, weight 65.8 kg (145 lb).    Physical Examination:   GEN: no apparent distress, comfortable  HEAD: atraumatic and normocephalic  EYES: no pallor, no icterus,   ENT: no swelling or CN palsy  NECK: neck incision site above sternum closed, healing, tender  CV: RRR with no murmur; normal pulse  CHEST: Normal respiratory effort; CTAB; distant breath sounds; no wheeze or crackles  ABDOM: nontender and nondistended; soft; normal bowel sounds; no rebound/guarding  MUSC/Skeletal: ROM normal; no crepitus; joints normal  EXTREM: no clubbing, cyanosis, inflammation or swelling, no edema or calf tenderness  SKIN:  sebaceous cyst lanced and draining, (Dr. Toledo)  : no sousa  NEURO: grossly intact; motor/sensory WNL; AAOx3; no tremors  PSYCH: normal mood, affect and behavior  LYMPH: normal cervical, supraclavicular, axillary and groin LN's  Breasts: NT, no palpable mass at L or R breast    I have reviewed all available lab results and radiology reports.    Na 121    Radiology/Diagnostic Studies:    CAT RLL mass, ? RML nodule, increased hilar and mediastinal LN.    She did not agree to repeat chest x-ray at this time. I believe she is afraid of what it will show.      Assessment:   (1) 51 y.o. female with diagnosis of hyponatremia, SIADH likely.  Fluid restriction of 1,000cc daily. Sodium is stable at 121.    (2)RLL  Small cell lung cancer with locally advanced disease involving the mediastinum.    (3)recheck CBC, and after white blood count and platelet count improves, will plan to proceed with day 1 course 2 of carboplatin and -16. Possibly the week of September 11.    At this time I am treating her with combination chemotherapy. I am considering the addition of concurrent radiation therapy if she agrees and if  she tolerates her second cycle well.  Thus far I don't see improvement in her cough or shortness of breath symptoms. Hopefully she will agree to chest x-ray or CAT scan after course #2.    (4)Chronic low back pain. Old MRI 8/2016 showed disc dx.    RTC 1-2 weeks.

## 2017-09-19 DIAGNOSIS — D64.81 ANEMIA DUE TO CHEMOTHERAPY: ICD-10-CM

## 2017-09-19 DIAGNOSIS — T45.1X5A ANEMIA DUE TO CHEMOTHERAPY: ICD-10-CM

## 2017-09-19 DIAGNOSIS — C34.11 MALIGNANT NEOPLASM OF UPPER LOBE OF RIGHT LUNG: Primary | ICD-10-CM

## 2017-09-28 ENCOUNTER — OFFICE VISIT (OUTPATIENT)
Dept: HEMATOLOGY/ONCOLOGY | Facility: CLINIC | Age: 52
End: 2017-09-28
Payer: COMMERCIAL

## 2017-09-28 VITALS
RESPIRATION RATE: 20 BRPM | HEART RATE: 101 BPM | DIASTOLIC BLOOD PRESSURE: 88 MMHG | SYSTOLIC BLOOD PRESSURE: 159 MMHG | TEMPERATURE: 99 F

## 2017-09-28 DIAGNOSIS — C34.11 MALIGNANT NEOPLASM OF UPPER LOBE OF RIGHT LUNG: Primary | ICD-10-CM

## 2017-09-28 DIAGNOSIS — R45.89 ANXIETY ABOUT HEALTH: ICD-10-CM

## 2017-09-28 PROCEDURE — 99213 OFFICE O/P EST LOW 20 MIN: CPT | Mod: ,,, | Performed by: INTERNAL MEDICINE

## 2017-09-28 NOTE — PROGRESS NOTES
[facility] History & Physical    Subjective:      Patient ID:   Maile Light  52 y.o. female  1965                                                                                 MD Steve Jennings MD      Chief Complaint:   RLL lung mass    HPI:  52 y.o. female with diagnosis of  Evaluation for elective breast reduction.  Found to have  Na 116-118.  Seen in ER, mass RLL.  SOB +, non productive cough.  No hemoptysis.  Appetite and weight stable.  Smoke 1 ppd x's 25 years.    She saw Dr. Toledo.  Bronchoscopy positve for tumor cells, likely small cell cancer, not clearly  Diagnostic.  Dr. Martinez for mediastinoscopy and  bx and tissue dx.  Preliminary report Small cell cancer of lung, final report confirmed the diagnosis.she is status post first cycle of carboplatin and -16. Nausea and vomiting symptoms have resolved. She is beginning to have some alopecia. No change in cough or shortness of breath, thus far.    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  Try to restrict po fluids to 1,000cc daily  To help with Na management.  Sodium at this time is stable at 121.     Small cell lung cancer,locally advanced dx.  S/P 1 cycle of Carboplatin, -16 x's 3 days.  S/P neulasta x's 1.due for cycle #2 week, if blood counts are recovered.    HTN, LBP,hernia repair x's 7, mesh in place.  She wants breast reduction.    Allergy no.  Job obrien,     Extensive abdomenal surgery, colostomy reversal, Dr. Eugene Parrish,   [facility], 3 years ago.  Lumbar back surgery x's 1.    Mom Ca ? Type., paralysis.  Dad GI cancer, colostomy.  10 Br and Sist, heart dx, renal dx, MVA death.    ROS:   GEN: normal without any fever, night sweats or weight loss  HEENT: normal with no HA's, sore throat, stiff neck, changes in vision  CV: normal with no CP, SOB, PND, DE OLIVEIRA or orthopnea  PULM: See HPI.   no SOB, cough, hemoptysis, sputum or pleuritic pain  GI: See HPI.   no abdominal pain, nausea,  vomiting, constipation, diarrhea, melanotic stools, BRBPR, or hematemesis  : normal with no hematuria, dysuria  BREAST: normal with no mass, discharge, pain  SKIN: normal with no rash, erythema, bruising, or swelling       Social History     Social History    Marital status:      Spouse name: N/A    Number of children: N/A    Years of education: N/A     Occupational History    Not on file.     Social History Main Topics    Smoking status: Current Every Day Smoker     Packs/day: 0.25     Years: 25.00     Types: Cigarettes    Smokeless tobacco: Never Used    Alcohol use Yes      Comment: social    Drug use: No    Sexual activity: No     Other Topics Concern    Not on file     Social History Narrative    No narrative on file         Current Outpatient Prescriptions:     albuterol 90 mcg/actuation inhaler, 2 puffs every 4 hours as needed for cough, wheeze, or shortness of breath, Disp: 1 Inhaler, Rfl: 11    albuterol-ipratropium 2.5mg-0.5mg/3mL (DUO-NEB) 0.5 mg-3 mg(2.5 mg base)/3 mL nebulizer solution, Take 3 mLs by nebulization every 6 (six) hours as needed., Disp: 120 vial, Rfl: 11    alprazolam (XANAX) 0.25 MG tablet, , Disp: , Rfl:     fluticasone-vilanterol (BREO ELLIPTA) 200-25 mcg/dose DsDv diskus inhaler, Inhale 1 puff into the lungs once daily., Disp: 1 each, Rfl: 11    hydrocodone-acetaminophen 5-325mg (NORCO) 5-325 mg per tablet, Take 1 tablet by mouth every 4 (four) hours as needed (cough). (Patient taking differently: Take 2 tablets by mouth every 4 (four) hours as needed (cough). ), Disp: 150 tablet, Rfl: 0    levoFLOXacin (LEVAQUIN) 500 MG tablet, Take 1 tablet (500 mg total) by mouth once daily., Disp: 7 tablet, Rfl: 0    ondansetron (ZOFRAN) 8 MG tablet, , Disp: , Rfl:     predniSONE (DELTASONE) 20 MG tablet, Take 2 daily for 3 days then One daily for 3 days and repeat for flare of lung symptoms as intructed, Disp: 24 tablet, Rfl: 0    promethazine (PHENERGAN) 12.5 MG Tab,  "Take 1 tablet (12.5 mg total) by mouth every 4 (four) hours as needed (nausea)., Disp: 60 tablet, Rfl: 0          Objective:   Vitals:  There were no vitals taken for this visit.    Physical Examination:   GEN: no apparent distress, comfortable  HEAD: atraumatic and normocephalic  EYES: no pallor, no icterus,   ENT: no swelling or CN palsy  NECK: neck incision site above sternum closed, healing, tender  CV: RRR with no murmur; normal pulse  CHEST: Normal respiratory effort; CTAB; distant breath sounds; no wheeze or crackles  ABDOM: nontender and nondistended; soft; normal bowel sounds; no rebound/guarding  MUSC/Skeletal: ROM normal; no crepitus; joints normal  EXTREM: no clubbing, cyanosis, inflammation or swelling, no edema or calf tenderness  SKIN:  sebaceous cyst lanced and draining, (Dr. Toledo)  : no sousa  NEURO: grossly intact; motor/sensory WNL; AAOx3; no tremors  PSYCH: normal mood, affect and behavior  LYMPH: normal cervical, supraclavicular, axillary and groin LN's  Breasts: NT, no palpable mass at L or R breast    I have reviewed all available lab results and radiology reports.    Na 121    Radiology/Diagnostic Studies:    CAT RLL mass, ? RML nodule, increased hilar and mediastinal LN.    She did not agree to repeat chest x-ray at this time. I believe she is afraid of what it will show.      Assessment:   (1) 52 y.o. female with diagnosis of hyponatremia, SIADH likely.  Fluid restriction of 1,000cc daily. Sodium is stable at 121.    (2)RLL  Small cell lung cancer with locally advanced disease involving the mediastinum.    (3)Course 3 ongoing.  She does not aggree to CXR  to check status of dx.  She says she "does not want any bad news".She is having a difficult time mentally and physically dealing with Chemotherapy alone.  I don't believe she would tolerate Rad Rx concurrently.  ? May be able to use sequencially chemo and then Rad Rx.    (4)Chronic low back pain. Old MRI 8/2016 showed disc dx.    RTC 1-2 " weeks.

## 2017-10-02 RX ORDER — ACETAMINOPHEN 325 MG/1
650 TABLET ORAL ONCE
Status: CANCELLED
Start: 2017-10-02

## 2017-10-02 RX ORDER — HEPARIN 100 UNIT/ML
500 SYRINGE INTRAVENOUS
Status: CANCELLED | OUTPATIENT
Start: 2017-10-04

## 2017-10-02 RX ORDER — HEPARIN 100 UNIT/ML
500 SYRINGE INTRAVENOUS
Status: CANCELLED | OUTPATIENT
Start: 2017-10-02

## 2017-10-02 RX ORDER — ACETAMINOPHEN 325 MG/1
650 TABLET ORAL ONCE
Status: CANCELLED
Start: 2017-10-04

## 2017-10-02 RX ORDER — SODIUM CHLORIDE 0.9 % (FLUSH) 0.9 %
10 SYRINGE (ML) INJECTION
Status: CANCELLED | OUTPATIENT
Start: 2017-10-02

## 2017-10-02 RX ORDER — HEPARIN 100 UNIT/ML
500 SYRINGE INTRAVENOUS
Status: CANCELLED | OUTPATIENT
Start: 2017-10-03

## 2017-10-02 RX ORDER — SODIUM CHLORIDE 0.9 % (FLUSH) 0.9 %
10 SYRINGE (ML) INJECTION
Status: CANCELLED | OUTPATIENT
Start: 2017-10-04

## 2017-10-02 RX ORDER — SODIUM CHLORIDE 0.9 % (FLUSH) 0.9 %
10 SYRINGE (ML) INJECTION
Status: CANCELLED | OUTPATIENT
Start: 2017-10-03

## 2017-10-02 RX ORDER — ACETAMINOPHEN 325 MG/1
650 TABLET ORAL ONCE
Status: CANCELLED
Start: 2017-10-03

## 2017-10-08 RX ORDER — ALPRAZOLAM 0.25 MG/1
0.25 TABLET ORAL 2 TIMES DAILY PRN
Qty: 60 TABLET | Refills: 0
Start: 2017-10-08

## 2017-10-11 ENCOUNTER — TELEPHONE (OUTPATIENT)
Dept: HEMATOLOGY/ONCOLOGY | Facility: CLINIC | Age: 52
End: 2017-10-11

## 2017-10-11 NOTE — TELEPHONE ENCOUNTER
----- Message from Miriam Moore sent at 10/11/2017 10:03 AM CDT -----  Patient called in stating that [facility] didn't fill all off her prescription for her pain medication. Patient is on [place] 5/325 and the pharmacy is [facility] in [place] #[phone]. She stated she would like to pick it up in [place] if possible. Please call her when ready to be picked up. Thanks

## 2017-10-11 NOTE — TELEPHONE ENCOUNTER
I called [facility] to see how many [place] pills she received at last fill. Rx was filled on 8/29 and was written for #120 and was only given #60 on 8/29.

## 2017-10-19 ENCOUNTER — OFFICE VISIT (OUTPATIENT)
Dept: HEMATOLOGY/ONCOLOGY | Facility: CLINIC | Age: 52
End: 2017-10-19
Payer: COMMERCIAL

## 2017-10-19 VITALS
WEIGHT: 140 LBS | RESPIRATION RATE: 18 BRPM | SYSTOLIC BLOOD PRESSURE: 151 MMHG | DIASTOLIC BLOOD PRESSURE: 91 MMHG | TEMPERATURE: 99 F | BODY MASS INDEX: 27.34 KG/M2 | HEART RATE: 89 BPM

## 2017-10-19 DIAGNOSIS — C34.11 MALIGNANT NEOPLASM OF UPPER LOBE OF RIGHT LUNG: Primary | ICD-10-CM

## 2017-10-19 DIAGNOSIS — F17.200 SMOKER: ICD-10-CM

## 2017-10-19 DIAGNOSIS — E22.2 SIADH (SYNDROME OF INAPPROPRIATE ADH PRODUCTION): ICD-10-CM

## 2017-10-19 DIAGNOSIS — T45.1X5A CHEMOTHERAPY-INDUCED NEUTROPENIA: ICD-10-CM

## 2017-10-19 DIAGNOSIS — F41.9 ANXIETY: ICD-10-CM

## 2017-10-19 DIAGNOSIS — R59.0 LYMPHADENOPATHY, MEDIASTINAL: ICD-10-CM

## 2017-10-19 DIAGNOSIS — J44.1 COPD EXACERBATION: ICD-10-CM

## 2017-10-19 DIAGNOSIS — D70.1 CHEMOTHERAPY-INDUCED NEUTROPENIA: ICD-10-CM

## 2017-10-19 PROCEDURE — 99214 OFFICE O/P EST MOD 30 MIN: CPT | Mod: ,,, | Performed by: INTERNAL MEDICINE

## 2017-10-19 RX ORDER — IPRATROPIUM BROMIDE AND ALBUTEROL SULFATE 2.5; .5 MG/3ML; MG/3ML
3 SOLUTION RESPIRATORY (INHALATION)
COMMUNITY
Start: 2017-08-03 | End: 2018-08-03

## 2017-10-19 RX ORDER — OXYCODONE HYDROCHLORIDE 5 MG/1
TABLET ORAL
COMMUNITY
Start: 2017-09-30 | End: 2017-11-10 | Stop reason: SDUPTHER

## 2017-10-19 RX ORDER — ALPRAZOLAM 0.25 MG/1
0.25 TABLET ORAL
COMMUNITY
Start: 2017-10-08

## 2017-10-19 RX ORDER — ALBUTEROL SULFATE 90 UG/1
AEROSOL, METERED RESPIRATORY (INHALATION)
COMMUNITY
Start: 2017-06-30

## 2017-10-19 RX ORDER — ONDANSETRON HYDROCHLORIDE 8 MG/1
TABLET, FILM COATED ORAL
COMMUNITY
Start: 2017-08-14

## 2017-10-19 RX ORDER — PREDNISONE 20 MG/1
TABLET ORAL
COMMUNITY
Start: 2017-06-30

## 2017-10-19 RX ORDER — PROMETHAZINE HYDROCHLORIDE 12.5 MG/1
12.5 TABLET ORAL
COMMUNITY
Start: 2017-07-11

## 2017-10-19 RX ORDER — HYDROCODONE BITARTRATE AND ACETAMINOPHEN 5; 325 MG/1; MG/1
1 TABLET ORAL
COMMUNITY
Start: 2017-08-03 | End: 2017-11-10

## 2017-10-19 RX ORDER — FLUTICASONE FUROATE AND VILANTEROL 200; 25 UG/1; UG/1
1 POWDER RESPIRATORY (INHALATION)
COMMUNITY
Start: 2017-06-30

## 2017-10-21 NOTE — PROGRESS NOTES
[facility] History & Physical    Subjective:      Patient ID:   Maile Light  52 y.o. female  1965                                                                                 MD Steve Jennings MD      Chief Complaint:   RLL lung mass    HPI:  52 y.o. female with diagnosis of  Evaluation for elective breast reduction.  Found to have  Na 116-118.  Seen in ER, mass RLL.  SOB +, non productive cough.  No hemoptysis.  Appetite and weight stable.  Smoke 1 ppd x's 25 years.    She saw Dr. Toledo.  Bronchoscopy positve for tumor cells, likely small cell cancer, not clearly  Diagnostic.  Dr. Martinez for mediastinoscopy and  bx and tissue dx.  Preliminary report Small cell cancer of lung, final report confirmed the diagnosis.she is status post first cycle of carboplatin and -16. Nausea and vomiting symptoms have resolved. She is beginning to have some alopecia. No change in cough or shortness of breath, thus far.    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  Try to restrict po fluids to 1,000cc daily  To help with Na management.  Sodium at this time is stable at 121.     Small cell lung cancer,locally advanced dx.  S/P 3/4 cycle of Carboplatin, -16 x's 3 days.  S/P neulasta.    She was at [facility], Hgb 6, 2-3 units of pRBC given.  Na 121.  CXR no R hilar prominence seen, no mass seen.  Abd. Gas pattern NL.    Today c/o constipation, trial of chronulac syrup discussed.  Energy improved after pRBC.    HTN, LBP,hernia repair x's 7, mesh in place.  She wants breast reduction.    Allergy no.  Job micah,     Extensive abdomenal surgery, colostomy reversal, Dr. Eugene Parrish,   [facility], 3 years ago.  Lumbar back surgery x's 1.    Mom Ca ? Type., paralysis.  Dad GI cancer, colostomy.  10 Br and Sist, heart dx, renal dx, MVA death.    ROS:   GEN: normal without any fever, night sweats or weight loss  HEENT: normal with no HA's, sore throat, stiff neck,  changes in vision  CV: normal with no CP, SOB, PND, DE OLIVEIRA or orthopnea  PULM: See HPI.   no SOB, cough, hemoptysis, sputum or pleuritic pain  GI: See HPI.   no abdominal pain, nausea, vomiting, constipation, diarrhea, melanotic stools, BRBPR, or hematemesis  : normal with no hematuria, dysuria  BREAST: normal with no mass, discharge, pain  SKIN: normal with no rash, erythema, bruising, or swelling       Social History     Social History    Marital status:      Spouse name: N/A    Number of children: N/A    Years of education: N/A     Occupational History    Not on file.     Social History Main Topics    Smoking status: Current Every Day Smoker     Packs/day: 0.25     Years: 25.00     Types: Cigarettes    Smokeless tobacco: Never Used    Alcohol use Yes      Comment: social    Drug use: No    Sexual activity: No     Other Topics Concern    Not on file     Social History Narrative    No narrative on file         Current Outpatient Prescriptions:     albuterol 90 mcg/actuation inhaler, 2 puffs every 4 hours as needed for cough, wheeze, or shortness of breath, Disp: 1 Inhaler, Rfl: 11    alprazolam (XANAX) 0.25 MG tablet, Take 1 tablet (0.25 mg total) by mouth 2 (two) times daily as needed for Anxiety., Disp: 60 tablet, Rfl: 0    fluticasone-vilanterol (BREO ELLIPTA) 200-25 mcg/dose DsDv diskus inhaler, Inhale 1 puff into the lungs once daily., Disp: 1 each, Rfl: 11    hydrocodone-acetaminophen 5-325mg (NORCO) 5-325 mg per tablet, Take 1 tablet by mouth every 4 (four) hours as needed (cough). (Patient taking differently: Take 2 tablets by mouth every 4 (four) hours as needed (cough). ), Disp: 150 tablet, Rfl: 0    ondansetron (ZOFRAN) 8 MG tablet, , Disp: , Rfl:     oxycodone (ROXICODONE) 5 MG immediate release tablet, , Disp: , Rfl:     predniSONE (DELTASONE) 20 MG tablet, Take 2 daily for 3 days then One daily for 3 days and repeat for flare of lung symptoms as intructed, Disp: 24 tablet, Rfl:  0    promethazine (PHENERGAN) 12.5 MG Tab, Take 1 tablet (12.5 mg total) by mouth every 4 (four) hours as needed (nausea)., Disp: 60 tablet, Rfl: 0    albuterol 90 mcg/actuation inhaler, 2 puffs every 4 hours as needed for cough, wheeze, or shortness of breath, Disp: , Rfl:     albuterol-ipratropium 2.5mg-0.5mg/3mL (DUO-NEB) 0.5 mg-3 mg(2.5 mg base)/3 mL nebulizer solution, Take 3 mLs by nebulization every 6 (six) hours as needed., Disp: 120 vial, Rfl: 11    albuterol-ipratropium 2.5mg-0.5mg/3mL (DUO-NEB) 0.5 mg-3 mg(2.5 mg base)/3 mL nebulizer solution, Inhale 3 mLs into the lungs., Disp: , Rfl:     alprazolam (XANAX) 0.25 MG tablet, Take 0.25 mg by mouth., Disp: , Rfl:     fluticasone-vilanterol (BREO) 200-25 mcg/dose DsDv diskus inhaler, Inhale 1 puff into the lungs., Disp: , Rfl:     hydrocodone-acetaminophen 5-325mg (NORCO) 5-325 mg per tablet, Take 1 tablet by mouth., Disp: , Rfl:     levoFLOXacin (LEVAQUIN) 500 MG tablet, Take 1 tablet (500 mg total) by mouth once daily., Disp: 7 tablet, Rfl: 0    ondansetron (ZOFRAN) 8 MG tablet, , Disp: , Rfl:     predniSONE (DELTASONE) 20 MG tablet, Take 2 daily for 3 days then One daily for 3 days and repeat for flare of lung symptoms as intructed, Disp: , Rfl:     promethazine (PHENERGAN) 12.5 MG Tab, Take 12.5 mg by mouth., Disp: , Rfl:           Objective:   Vitals:  Blood pressure (!) 151/91, pulse 89, temperature 98.7 °F (37.1 °C), resp. rate 18, weight 63.5 kg (140 lb).    Physical Examination:   GEN: no apparent distress, comfortable  HEAD: atraumatic and normocephalic  EYES: no pallor, no icterus,   ENT: no swelling or CN palsy  NECK: neck incision site above sternum closed, healing, tender  CV: RRR with no murmur; normal pulse  CHEST: Normal respiratory effort; CTAB; distant breath sounds; no wheeze or crackles  ABDOM: nontender and nondistended; soft; normal bowel sounds; no rebound/guarding  MUSC/Skeletal: ROM normal; no crepitus; joints  normal  EXTREM: no clubbing, cyanosis, inflammation or swelling, no edema or calf tenderness  SKIN:  sebaceous cyst lanced and draining, (Dr. Toleod)  : no sousa  NEURO: grossly intact; motor/sensory WNL; AAOx3; no tremors  PSYCH: normal mood, affect and behavior  LYMPH: normal cervical, supraclavicular, axillary and groin LN's  Breasts: NT, no palpable mass at L or R breast    I have reviewed all available lab results and radiology reports.    Na 121    Assessment:   (1) 52 y.o. female with diagnosis of hyponatremia, SIADH likely.  Fluid restriction of 1,000cc daily. Sodium is stable at 121.    (2)RLL  Small cell lung cancer with locally advanced disease involving the mediastinum.  Appears improved per CXR after 3 units of chemo.  Due for #4 next week.      She has had a difficult time mentally and physically dealing with Chemotherapy alone.  I don't believe she would tolerate Rad Rx concurrently.    ? May be able to use sequencially chemo and then Rad Rx.    After course #4, wwill repeat CAT scan to check status,  And make a decision on 6-8 cycles of chemo.    Rad Rx adjuvant Rx?    See me 1 month

## 2017-10-22 RX ORDER — SODIUM CHLORIDE 0.9 % (FLUSH) 0.9 %
10 SYRINGE (ML) INJECTION
Status: CANCELLED | OUTPATIENT
Start: 2017-10-23

## 2017-10-22 RX ORDER — SODIUM CHLORIDE 0.9 % (FLUSH) 0.9 %
10 SYRINGE (ML) INJECTION
Status: CANCELLED | OUTPATIENT
Start: 2017-10-24

## 2017-10-22 RX ORDER — HEPARIN 100 UNIT/ML
500 SYRINGE INTRAVENOUS
Status: CANCELLED | OUTPATIENT
Start: 2017-10-24

## 2017-10-22 RX ORDER — HEPARIN 100 UNIT/ML
500 SYRINGE INTRAVENOUS
Status: CANCELLED | OUTPATIENT
Start: 2017-10-25

## 2017-10-22 RX ORDER — ACETAMINOPHEN 325 MG/1
650 TABLET ORAL ONCE
Status: CANCELLED
Start: 2017-10-23

## 2017-10-22 RX ORDER — SODIUM CHLORIDE 0.9 % (FLUSH) 0.9 %
10 SYRINGE (ML) INJECTION
Status: CANCELLED | OUTPATIENT
Start: 2017-10-25

## 2017-10-22 RX ORDER — HEPARIN 100 UNIT/ML
500 SYRINGE INTRAVENOUS
Status: CANCELLED | OUTPATIENT
Start: 2017-10-23

## 2017-10-22 RX ORDER — ACETAMINOPHEN 325 MG/1
650 TABLET ORAL ONCE
Status: CANCELLED
Start: 2017-10-25

## 2017-10-22 RX ORDER — ACETAMINOPHEN 325 MG/1
650 TABLET ORAL ONCE
Status: CANCELLED
Start: 2017-10-24

## 2017-11-10 ENCOUNTER — TELEPHONE (OUTPATIENT)
Dept: HEMATOLOGY/ONCOLOGY | Facility: CLINIC | Age: 52
End: 2017-11-10

## 2017-11-10 DIAGNOSIS — G89.4 CHRONIC PAIN SYNDROME: Primary | ICD-10-CM

## 2017-11-10 RX ORDER — OXYCODONE HYDROCHLORIDE 5 MG/1
5 TABLET ORAL EVERY 6 HOURS PRN
Qty: 120 TABLET | Refills: 0 | Status: SHIPPED | OUTPATIENT
Start: 2017-11-10 | End: 2017-12-10

## 2017-11-14 DIAGNOSIS — C34.11 MALIGNANT NEOPLASM OF UPPER LOBE OF RIGHT LUNG: Primary | ICD-10-CM

## 2017-11-14 LAB
ALBUMIN SERPL-MCNC: 4.4 G/DL (ref 3.1–4.7)
ALP SERPL-CCNC: 89 IU/L (ref 40–104)
ALT (SGPT): 10 IU/L (ref 3–33)
AST SERPL-CCNC: 18 IU/L (ref 10–40)
BASOPHILS NFR BLD: 0 %
BASOPHILS NFR BLD: 0 K/UL (ref 0–0.2)
BILIRUB SERPL-MCNC: 0.4 MG/DL (ref 0.3–1)
BUN SERPL-MCNC: 7 MG/DL (ref 8–20)
CALCIUM SERPL-MCNC: 8.9 MG/DL (ref 7.7–10.4)
CHLORIDE: 82 MMOL/L (ref 98–110)
CO2 SERPL-SCNC: 23.3 MMOL/L (ref 22.8–31.6)
CREATININE: 0.54 MG/DL (ref 0.6–1.4)
EOSINOPHIL NFR BLD: 0 K/UL (ref 0–0.7)
EOSINOPHIL NFR BLD: 0.4 %
ERYTHROCYTE [DISTWIDTH] IN BLOOD BY AUTOMATED COUNT: 27 % (ref 11.7–14.9)
GLUCOSE: 97 MG/DL (ref 70–99)
GRAN #: 3.4 K/UL (ref 1.4–6.5)
GRAN%: 69 %
HCT VFR BLD AUTO: 19.4 % (ref 36–48)
HGB BLD-MCNC: 6.8 G/DL (ref 12–15)
IMMATURE GRANS (ABS): 0 K/UL (ref 0–1)
IMMATURE GRANULOCYTES: 0.8 %
LYMPH #: 0.7 K/UL (ref 1.2–3.4)
LYMPH%: 14.1 %
MCH RBC QN AUTO: 32.4 PG (ref 25–35)
MCHC RBC AUTO-ENTMCNC: 35.1 G/DL (ref 31–36)
MCV RBC AUTO: 92.4 FL (ref 79–98)
MONO #: 0.8 K/UL (ref 0.1–0.6)
MONO%: 15.7 %
NUCLEATED RBCS: 0 %
PLATELET # BLD AUTO: 160 K/UL (ref 140–440)
PMV BLD AUTO: 10.4 FL (ref 8.8–12.7)
POTASSIUM SERPL-SCNC: 3.6 MMOL/L (ref 3.5–5)
PROT SERPL-MCNC: 7.1 G/DL (ref 6–8.2)
RBC # BLD AUTO: 2.1 M/UL (ref 3.5–5.5)
SODIUM: 116 MMOL/L (ref 134–144)
WBC # BLD AUTO: 5 K/UL (ref 5–10)

## 2017-11-14 RX ORDER — SODIUM CHLORIDE 0.9 % (FLUSH) 0.9 %
10 SYRINGE (ML) INJECTION
Status: CANCELLED | OUTPATIENT
Start: 2017-11-29

## 2017-11-14 RX ORDER — HEPARIN 100 UNIT/ML
500 SYRINGE INTRAVENOUS
Status: CANCELLED | OUTPATIENT
Start: 2017-11-30

## 2017-11-14 RX ORDER — ACETAMINOPHEN 325 MG/1
650 TABLET ORAL ONCE
Status: CANCELLED
Start: 2017-11-30

## 2017-11-14 RX ORDER — HEPARIN 100 UNIT/ML
500 SYRINGE INTRAVENOUS
Status: CANCELLED | OUTPATIENT
Start: 2017-11-29

## 2017-11-14 RX ORDER — HEPARIN 100 UNIT/ML
500 SYRINGE INTRAVENOUS
Status: CANCELLED | OUTPATIENT
Start: 2017-11-14

## 2017-11-14 RX ORDER — SODIUM CHLORIDE 0.9 % (FLUSH) 0.9 %
10 SYRINGE (ML) INJECTION
Status: CANCELLED | OUTPATIENT
Start: 2017-11-30

## 2017-11-14 RX ORDER — ACETAMINOPHEN 325 MG/1
650 TABLET ORAL ONCE
Status: CANCELLED
Start: 2017-11-14

## 2017-11-14 RX ORDER — SODIUM CHLORIDE 0.9 % (FLUSH) 0.9 %
10 SYRINGE (ML) INJECTION
Status: CANCELLED | OUTPATIENT
Start: 2017-11-14

## 2017-11-14 RX ORDER — ACETAMINOPHEN 325 MG/1
650 TABLET ORAL ONCE
Status: CANCELLED
Start: 2017-11-29

## 2017-11-16 LAB
BASOPHILS NFR BLD: 0 K/UL (ref 0–0.2)
BASOPHILS NFR BLD: 0.2 %
BUN SERPL-MCNC: 8 MG/DL (ref 8–20)
CALCIUM SERPL-MCNC: 9.2 MG/DL (ref 7.7–10.4)
CHLORIDE: 82 MMOL/L (ref 98–110)
CO2 SERPL-SCNC: 24.4 MMOL/L (ref 22.8–31.6)
CREATININE: 0.56 MG/DL (ref 0.6–1.4)
EOSINOPHIL NFR BLD: 0 K/UL (ref 0–0.7)
EOSINOPHIL NFR BLD: 0.2 %
ERYTHROCYTE [DISTWIDTH] IN BLOOD BY AUTOMATED COUNT: 22.3 % (ref 11.7–14.9)
GLUCOSE: 84 MG/DL (ref 70–99)
GRAN #: 2.7 K/UL (ref 1.4–6.5)
GRAN%: 61.5 %
HCT VFR BLD AUTO: 33 % (ref 36–48)
HGB BLD-MCNC: 11.4 G/DL (ref 12–15)
IMMATURE GRANS (ABS): 0 K/UL (ref 0–1)
IMMATURE GRANULOCYTES: 0.5 %
LYMPH #: 0.8 K/UL (ref 1.2–3.4)
LYMPH%: 18.6 %
MCH RBC QN AUTO: 30.4 PG (ref 25–35)
MCHC RBC AUTO-ENTMCNC: 34.5 G/DL (ref 31–36)
MCV RBC AUTO: 88 FL (ref 79–98)
MONO #: 0.8 K/UL (ref 0.1–0.6)
MONO%: 19 %
NUCLEATED RBCS: 0 %
PLATELET # BLD AUTO: 196 K/UL (ref 140–440)
PMV BLD AUTO: 10 FL (ref 8.8–12.7)
POTASSIUM SERPL-SCNC: 3.6 MMOL/L (ref 3.5–5)
RBC # BLD AUTO: 3.75 M/UL (ref 3.5–5.5)
SODIUM: 117 MMOL/L (ref 134–144)
WBC # BLD AUTO: 4.4 K/UL (ref 5–10)

## 2017-11-17 ENCOUNTER — TELEPHONE (OUTPATIENT)
Dept: HEMATOLOGY/ONCOLOGY | Facility: CLINIC | Age: 52
End: 2017-11-17

## 2017-11-17 ENCOUNTER — DOCUMENTATION ONLY (OUTPATIENT)
Dept: HEMATOLOGY/ONCOLOGY | Facility: CLINIC | Age: 52
End: 2017-11-17

## 2017-11-17 RX ORDER — SODIUM CHLORIDE 0.9 % (FLUSH) 0.9 %
10 SYRINGE (ML) INJECTION
Status: CANCELLED | OUTPATIENT
Start: 2017-11-28

## 2017-11-17 RX ORDER — HEPARIN 100 UNIT/ML
500 SYRINGE INTRAVENOUS
Status: CANCELLED | OUTPATIENT
Start: 2017-11-28

## 2017-11-17 RX ORDER — ACETAMINOPHEN 325 MG/1
650 TABLET ORAL ONCE
Status: CANCELLED
Start: 2017-11-28

## 2017-11-17 NOTE — PROGRESS NOTES
Earlier this week she was due for her chemotherapy. She complained of shortness of breath with exertion and increased fatigue. Her hemoglobin approximated 7. Chemotherapy was held and 3 units of packed red blood cells were given.    I spoke with her today, energy is much improved,shortness of breath and dyspnea on exertion has resolved.    CAT scan was done this week to check on the status of her cancer. The CAT scan is much improved. The primary mass in the lung has reduced by approximately 40%.  Mediastinal and hilar lymphadenopathy is much improved.    My recommendation is that we continue on the chemotherapy for  a total of 6-8 cycles,and then refer her to the radiation therapist for consolidation.    She has SIADH with chronic hyponatremia, sodium runs around 120. I have asked her to restrict her fluids to less than 1 L per day. By her own admission,  she is noncompliant in restricting her fluids. Overall she appears to function fairly well with this degree of hyponatremia.the hyponatremia should resolve with eventual control of her malignancy.    She is willing to resume her chemotherapy, but wants to wait until after Thanksgiving. I will schedule her for the week of November 27. All of this was confirmed with her by telephone today.

## 2017-11-20 ENCOUNTER — TELEPHONE (OUTPATIENT)
Dept: HEMATOLOGY/ONCOLOGY | Facility: CLINIC | Age: 52
End: 2017-11-20

## 2017-11-20 NOTE — TELEPHONE ENCOUNTER
----- Message from Katelyn Huff sent at 11/20/2017  9:14 AM CST -----  Pt called in said she can not stop coughing asking that dr jones write her an RX for cough syrup and also pt said she needs refill on xanax .025mg 90 tablets   Pt call back [phone]

## 2017-11-20 NOTE — TELEPHONE ENCOUNTER
calledRx for Tessalon Perles 100 mg #90 1 po q 8 hours TID prn cough # 1 refill, Xanax 0.25 mg # 90 1 po TID prn anxiety #1 refill.

## 2017-11-28 ENCOUNTER — TELEPHONE (OUTPATIENT)
Dept: HEMATOLOGY/ONCOLOGY | Facility: CLINIC | Age: 52
End: 2017-11-28

## 2017-11-28 LAB
ALBUMIN SERPL-MCNC: 4.5 G/DL (ref 3.1–4.7)
ALP SERPL-CCNC: 76 IU/L (ref 40–104)
ALT (SGPT): 10 IU/L (ref 3–33)
AST SERPL-CCNC: 18 IU/L (ref 10–40)
BASOPHILS NFR BLD: 0 K/UL (ref 0–0.2)
BASOPHILS NFR BLD: 0.7 %
BILIRUB SERPL-MCNC: 0.7 MG/DL (ref 0.3–1)
BUN SERPL-MCNC: 9 MG/DL (ref 8–20)
CALCIUM SERPL-MCNC: 9.2 MG/DL (ref 7.7–10.4)
CHLORIDE: 77 MMOL/L (ref 98–110)
CO2 SERPL-SCNC: 25 MMOL/L (ref 22.8–31.6)
CREATININE: 0.46 MG/DL (ref 0.6–1.4)
EOSINOPHIL NFR BLD: 0 K/UL (ref 0–0.7)
EOSINOPHIL NFR BLD: 0.7 %
ERYTHROCYTE [DISTWIDTH] IN BLOOD BY AUTOMATED COUNT: 18.3 % (ref 11.7–14.9)
GLUCOSE: 90 MG/DL (ref 70–99)
GRAN #: 1.3 K/UL (ref 1.4–6.5)
GRAN%: 48 %
HCT VFR BLD AUTO: 33.9 % (ref 36–48)
HGB BLD-MCNC: 12.2 G/DL (ref 12–15)
IMMATURE GRANS (ABS): 0 K/UL (ref 0–1)
IMMATURE GRANULOCYTES: 0.4 %
LYMPH #: 0.7 K/UL (ref 1.2–3.4)
LYMPH%: 26.7 %
MCH RBC QN AUTO: 31 PG (ref 25–35)
MCHC RBC AUTO-ENTMCNC: 36 G/DL (ref 31–36)
MCV RBC AUTO: 86.3 FL (ref 79–98)
MONO #: 0.7 K/UL (ref 0.1–0.6)
MONO%: 23.5 %
NUCLEATED RBCS: 0 %
PLATELET # BLD AUTO: 284 K/UL (ref 140–440)
PMV BLD AUTO: 9.8 FL (ref 8.8–12.7)
POTASSIUM SERPL-SCNC: 3.6 MMOL/L (ref 3.5–5)
PROT SERPL-MCNC: 7.4 G/DL (ref 6–8.2)
RBC # BLD AUTO: 3.93 M/UL (ref 3.5–5.5)
SODIUM: 114 MMOL/L (ref 134–144)
WBC # BLD AUTO: 2.8 K/UL (ref 5–10)

## 2017-11-28 NOTE — TELEPHONE ENCOUNTER
Called the pt to confirm that she will keep her scheduled appt in [place]. 11/29/17.    Pt is going to be in chemo treatment tomorrow so we will try and see her during that time instead of in the [place] office.

## 2017-11-28 NOTE — TELEPHONE ENCOUNTER
----- Message from Chari Rdz sent at 11/28/2017  3:10 PM CST -----  Contact: [facility]  PATIENT REQUEST REFILL OF MEDICATION   oxyCODONE (ROXICODONE) 5 MG immediate release tablet 120 tablet   ALSO STATES THAT THE OTHER MEDICATION DOES NOT WORK FOR COUGH AND SHE WOULD LIKE LIQUID GUAIFENSENIN-CODINE  MG/5ML

## 2017-11-29 ENCOUNTER — OFFICE VISIT (OUTPATIENT)
Dept: HEMATOLOGY/ONCOLOGY | Facility: CLINIC | Age: 52
End: 2017-11-29
Payer: COMMERCIAL

## 2017-11-29 VITALS
SYSTOLIC BLOOD PRESSURE: 160 MMHG | DIASTOLIC BLOOD PRESSURE: 94 MMHG | HEART RATE: 92 BPM | WEIGHT: 138 LBS | BODY MASS INDEX: 26.95 KG/M2 | RESPIRATION RATE: 18 BRPM | TEMPERATURE: 98 F

## 2017-11-29 DIAGNOSIS — D70.1 CHEMOTHERAPY-INDUCED NEUTROPENIA: ICD-10-CM

## 2017-11-29 DIAGNOSIS — C34.11 MALIGNANT NEOPLASM OF UPPER LOBE OF RIGHT LUNG: Primary | ICD-10-CM

## 2017-11-29 DIAGNOSIS — T45.1X5A CHEMOTHERAPY-INDUCED NEUTROPENIA: ICD-10-CM

## 2017-11-29 PROCEDURE — 99213 OFFICE O/P EST LOW 20 MIN: CPT | Mod: ,,, | Performed by: INTERNAL MEDICINE

## 2017-11-30 ENCOUNTER — TELEPHONE (OUTPATIENT)
Dept: HEMATOLOGY/ONCOLOGY | Facility: CLINIC | Age: 52
End: 2017-11-30

## 2017-12-01 NOTE — TELEPHONE ENCOUNTER
Tell infusion that Mrs. Light is stopping her chemo.  She will come in 12/1 for the neulasta shot, see therapy plan.

## 2017-12-01 NOTE — PROGRESS NOTES
[facility] History & Physical    Subjective:      Patient ID:   Maile Light  52 y.o. female  1965                                                                                 MD Steve Jennings MD      Chief Complaint:   RLL lung mass    HPI:  52 y.o. female with diagnosis of  Evaluation for elective breast reduction.  Found to have  Na 116-118.  Seen in ER, mass RLL.  SOB +, non productive cough.  No hemoptysis.  Appetite and weight stable.  Smoke 1 ppd x's 25 years.    She saw Dr. Toledo.  Bronchoscopy positve for tumor cells, likely small cell cancer, not clearly  Diagnostic.  Dr. Martinez for mediastinoscopy and  bx and tissue dx.  Preliminary report Small cell cancer of lung, final report confirmed the diagnosis.she is status post first cycle of carboplatin and -16. Nausea and vomiting symptoms have resolved. She is beginning to have some alopecia. No change in cough or shortness of breath, thus far.    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  Try to restrict po fluids to 1,000cc daily  To help with Na management.  Sodium at this time is stable at 121.     Small cell lung cancer,locally advanced dx.  S/P 3/4 cycle of Carboplatin, -16 x's 3 days.  S/P neulasta.    She was at [facility], Hgb 6, 2-3 units of pRBC given.  Na 121.  CXR no R hilar prominence seen, no mass seen.  Abd. Gas pattern NL.    Today c/o constipation, trial of chronulac syrup discussed.  Energy improved after pRBC.    HTN, LBP,hernia repair x's 7, mesh in place.  She wants breast reduction.    She is on Carboplatin, -16  D2C5.  C/O nausea, says she is tired of feeling bad, no good days on chemo.  She wants to hold chemo, she does not want to take it any more,  in aggreement with her.    They will take neulasta 12/1.  Later, will refer her to Rad Rx MD for evaluation for Rx.    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  painter Criselda    Extensive abdomenal surgery, colostomy reversal, Dr. Eugene Parrish,    [facility], 3 years ago.  Lumbar back surgery x's 1.    Mom Ca ? Type., paralysis.  Dad GI cancer, colostomy.  10 Br and Sist, heart dx, renal dx, MVA death.    ROS:   GEN: normal without any fever, night sweats or weight loss  HEENT: normal with no HA's, sore throat, stiff neck, changes in vision  CV: normal with no CP, SOB, PND, DE OLIVEIRA or orthopnea  PULM: See HPI.   no SOB, cough, hemoptysis, sputum or pleuritic pain  GI: See HPI.   no abdominal pain, nausea, vomiting, constipation, diarrhea, melanotic stools, BRBPR, or hematemesis  : normal with no hematuria, dysuria  BREAST: normal with no mass, discharge, pain  SKIN: normal with no rash, erythema, bruising, or swelling       Social History     Social History    Marital status:      Spouse name: N/A    Number of children: N/A    Years of education: N/A     Occupational History    Not on file.     Social History Main Topics    Smoking status: Current Every Day Smoker     Packs/day: 0.25     Years: 25.00     Types: Cigarettes    Smokeless tobacco: Never Used    Alcohol use Yes      Comment: social    Drug use: No    Sexual activity: No     Other Topics Concern    Not on file     Social History Narrative    No narrative on file         Current Outpatient Prescriptions:     albuterol 90 mcg/actuation inhaler, 2 puffs every 4 hours as needed for cough, wheeze, or shortness of breath, Disp: 1 Inhaler, Rfl: 11    alprazolam (XANAX) 0.25 MG tablet, Take 1 tablet (0.25 mg total) by mouth 2 (two) times daily as needed for Anxiety., Disp: 60 tablet, Rfl: 0    fluticasone-vilanterol (BREO ELLIPTA) 200-25 mcg/dose DsDv diskus inhaler, Inhale 1 puff into the lungs once daily., Disp: 1 each, Rfl: 11    ondansetron (ZOFRAN) 8 MG tablet, , Disp: , Rfl:     oxyCODONE (ROXICODONE) 5 MG immediate release tablet, Take 1 tablet (5 mg total) by mouth every 6 (six) hours as needed for Pain., Disp: 120 tablet, Rfl: 0    predniSONE (DELTASONE) 20  MG tablet, Take 2 daily for 3 days then One daily for 3 days and repeat for flare of lung symptoms as intructed, Disp: 24 tablet, Rfl: 0    promethazine (PHENERGAN) 12.5 MG Tab, Take 1 tablet (12.5 mg total) by mouth every 4 (four) hours as needed (nausea)., Disp: 60 tablet, Rfl: 0    albuterol 90 mcg/actuation inhaler, 2 puffs every 4 hours as needed for cough, wheeze, or shortness of breath, Disp: , Rfl:     albuterol-ipratropium 2.5mg-0.5mg/3mL (DUO-NEB) 0.5 mg-3 mg(2.5 mg base)/3 mL nebulizer solution, Take 3 mLs by nebulization every 6 (six) hours as needed., Disp: 120 vial, Rfl: 11    albuterol-ipratropium 2.5mg-0.5mg/3mL (DUO-NEB) 0.5 mg-3 mg(2.5 mg base)/3 mL nebulizer solution, Inhale 3 mLs into the lungs., Disp: , Rfl:     alprazolam (XANAX) 0.25 MG tablet, Take 0.25 mg by mouth., Disp: , Rfl:     fluticasone-vilanterol (BREO) 200-25 mcg/dose DsDv diskus inhaler, Inhale 1 puff into the lungs., Disp: , Rfl:     levoFLOXacin (LEVAQUIN) 500 MG tablet, Take 1 tablet (500 mg total) by mouth once daily., Disp: 7 tablet, Rfl: 0    ondansetron (ZOFRAN) 8 MG tablet, , Disp: , Rfl:     predniSONE (DELTASONE) 20 MG tablet, Take 2 daily for 3 days then One daily for 3 days and repeat for flare of lung symptoms as intructed, Disp: , Rfl:     promethazine (PHENERGAN) 12.5 MG Tab, Take 12.5 mg by mouth., Disp: , Rfl:           Objective:   Vitals:  Blood pressure (!) 160/94, pulse 92, temperature 98.1 °F (36.7 °C), resp. rate 18, weight 62.6 kg (138 lb).    Physical Examination:   GEN: distraught and frustrated  HEAD: atraumatic and normocephalic  EYES: no pallor, no icterus,   ENT: no swelling or CN palsy  NECK: neck incision site above sternum closed  CV: RRR with no murmur; normal pulse  CHEST: Normal respiratory effort; CTAB; distant breath sounds; no wheeze or crackles  ABDOM: nontender and nondistended; soft; normal bowel sounds; no rebound/guarding  MUSC/Skeletal: ROM normal; no crepitus; joints  normal  EXTREM: no clubbing, cyanosis, inflammation or swelling, no edema or calf tenderness  SKIN:  Negative exam  : no sousa  NEURO: grossly intact; motor/sensory WNL; no tremors  PSYCH: normal mood, affect and behavior  LYMPH: normal cervical, supraclavicular, axillary and groin LN's  Breasts: NT, no palpable mass at L or R breast    I have reviewed all available lab results and radiology reports.    Na 121-117.    Assessment:   (1) 52 y.o. female with diagnosis of hyponatremia, SIADH likely.  Fluid restriction of 1,000cc daily. Sodium is stable at 121.    (2)RLL  Small cell lung cancer with locally advanced disease involving the mediastinum.  Appears improved per CAT after 4 cycles.  30-40% reduction in mass/dx.  Now on #5.     Rad Rx adjuvant Rx?    See me 1 month

## 2017-12-13 ENCOUNTER — OFFICE VISIT (OUTPATIENT)
Dept: HEMATOLOGY/ONCOLOGY | Facility: CLINIC | Age: 52
End: 2017-12-13
Payer: COMMERCIAL

## 2017-12-13 VITALS
SYSTOLIC BLOOD PRESSURE: 185 MMHG | RESPIRATION RATE: 18 BRPM | BODY MASS INDEX: 26.37 KG/M2 | TEMPERATURE: 97 F | DIASTOLIC BLOOD PRESSURE: 101 MMHG | WEIGHT: 135 LBS | HEART RATE: 97 BPM

## 2017-12-13 DIAGNOSIS — C34.11 MALIGNANT NEOPLASM OF UPPER LOBE OF RIGHT LUNG: Primary | ICD-10-CM

## 2017-12-13 PROCEDURE — 99214 OFFICE O/P EST MOD 30 MIN: CPT | Mod: ,,, | Performed by: INTERNAL MEDICINE

## 2017-12-14 NOTE — PROGRESS NOTES
[facility] History & Physical    Subjective:      Patient ID:   Maile Light  52 y.o. female  1965                                                                                  Steve Toledo MD.  Johny Vasquez MD.  Matty Guerra MD.      Chief Complaint:   RLL lung mass    HPI:  52 y.o. female with Evaluation for elective breast reduction.  Found to have Small Cell lung Cancer, locally advanced RUL dx.  Na 116-118.  Seen in ER, mass RLL.  SOB +, non productive cough.  No hemoptysis.  Appetite and weight stable.  Smoke 1 ppd x's 25 years.    She saw Dr. Toledo.  Bronchoscopy positve for tumor cells, likely small cell cancer, not clearly  Diagnostic.  Dr. Martinez for mediastinoscopy and  bx and tissue dx.  Preliminary report Small cell cancer of lung, final report confirmed the diagnosis.she is status post 5 cycle of carboplatin and -16. Nausea and vomiting symptoms have resolved. She is beginning to have some alopecia. No change in cough or shortness of breath, thus far.    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  Try to restrict po fluids to 1,000cc daily  To help with Na management.  Sodium at this time is stable at 121.     Small cell lung cancer,locally advanced dx.  S/P 3/4 cycle of Carboplatin, -16 x's 3 days.  S/P neulasta.    She was at [facility], Hgb 6, 2-3 units of pRBC given.  Na 121.  CXR no R hilar prominence seen, no mass seen.  Abd. Gas pattern NL.    Today c/o constipation, trial of chronulac syrup discussed.  Energy improved after pRBC.    HTN, LBP,hernia repair x's 7, mesh in place.  She wants breast reduction.    She is on Carboplatin, -16  D2C5.  C/O nausea, says she is tired of feeling bad, no good days on chemo.  She wants to hold chemo, she does not want to take it any more,  in aggreement with her.    They will take neulasta 12/1.  Later, will refer her to Rad Rx MD for evaluation for Rx.    She returns today.  Cough controlled with cough syrup.   Intermittent nose bleeds.  Check lab tomorrow HH.    Increase oxycodone 10mg 1 q 6 hours prn pain for pain control.    Allergy no.  Job obrien,     Extensive abdomenal surgery, colostomy reversal, Dr. Eugene Parrish,   [facility], 3 years ago.  Lumbar back surgery x's 1.    Mom Ca ? Type., paralysis.  Dad GI cancer, colostomy.  10 Br and Sist, heart dx, renal dx, MVA death.    ROS:   GEN: normal without any fever, night sweats or weight loss  HEENT: See HPI.  CV: normal with no CP, SOB, PND, DE OLIVEIRA or orthopnea  PULM: See HPI.    GI: See HPI.   no abdominal pain, nausea, vomiting, constipation, diarrhea, melanotic stools, BRBPR, or hematemesis  : normal with no hematuria, dysuria  BREAST: normal with no mass, discharge, pain  SKIN: normal with no rash, erythema, bruising, or swelling       Social History     Social History    Marital status:      Spouse name: N/A    Number of children: N/A    Years of education: N/A     Occupational History    Not on file.     Social History Main Topics    Smoking status: Current Every Day Smoker     Packs/day: 0.25     Years: 25.00     Types: Cigarettes    Smokeless tobacco: Never Used    Alcohol use Yes      Comment: social    Drug use: No    Sexual activity: No     Other Topics Concern    Not on file     Social History Narrative    No narrative on file         Current Outpatient Prescriptions:     alprazolam (XANAX) 0.25 MG tablet, Take 1 tablet (0.25 mg total) by mouth 2 (two) times daily as needed for Anxiety., Disp: 60 tablet, Rfl: 0    fluticasone-vilanterol (BREO ELLIPTA) 200-25 mcg/dose DsDv diskus inhaler, Inhale 1 puff into the lungs once daily., Disp: 1 each, Rfl: 11    ondansetron (ZOFRAN) 8 MG tablet, , Disp: , Rfl:     ondansetron (ZOFRAN) 8 MG tablet, , Disp: , Rfl:     albuterol 90 mcg/actuation inhaler, 2 puffs every 4 hours as needed for cough, wheeze, or shortness of breath, Disp: 1 Inhaler, Rfl: 11    albuterol  90 mcg/actuation inhaler, 2 puffs every 4 hours as needed for cough, wheeze, or shortness of breath, Disp: , Rfl:     albuterol-ipratropium 2.5mg-0.5mg/3mL (DUO-NEB) 0.5 mg-3 mg(2.5 mg base)/3 mL nebulizer solution, Take 3 mLs by nebulization every 6 (six) hours as needed., Disp: 120 vial, Rfl: 11    albuterol-ipratropium 2.5mg-0.5mg/3mL (DUO-NEB) 0.5 mg-3 mg(2.5 mg base)/3 mL nebulizer solution, Inhale 3 mLs into the lungs., Disp: , Rfl:     alprazolam (XANAX) 0.25 MG tablet, Take 0.25 mg by mouth., Disp: , Rfl:     fluticasone-vilanterol (BREO) 200-25 mcg/dose DsDv diskus inhaler, Inhale 1 puff into the lungs., Disp: , Rfl:     levoFLOXacin (LEVAQUIN) 500 MG tablet, Take 1 tablet (500 mg total) by mouth once daily., Disp: 7 tablet, Rfl: 0    oxyCODONE (ROXICODONE) 5 MG immediate release tablet, Take 1 tablet (5 mg total) by mouth every 6 (six) hours as needed for Pain., Disp: 120 tablet, Rfl: 0    predniSONE (DELTASONE) 20 MG tablet, Take 2 daily for 3 days then One daily for 3 days and repeat for flare of lung symptoms as intructed, Disp: 24 tablet, Rfl: 0    predniSONE (DELTASONE) 20 MG tablet, Take 2 daily for 3 days then One daily for 3 days and repeat for flare of lung symptoms as intructed, Disp: , Rfl:     promethazine (PHENERGAN) 12.5 MG Tab, Take 1 tablet (12.5 mg total) by mouth every 4 (four) hours as needed (nausea)., Disp: 60 tablet, Rfl: 0    promethazine (PHENERGAN) 12.5 MG Tab, Take 12.5 mg by mouth., Disp: , Rfl:           Objective:   Vitals:  Blood pressure (!) 185/101, pulse 97, temperature 97 °F (36.1 °C), resp. rate 18, weight 61.2 kg (135 lb).    Physical Examination:   GEN: appears stronger today, calmer  HEAD: atraumatic and normocephalic  EYES: no pallor, no icterus,   ENT: no swelling or CN palsy  NECK: neck incision site above sternum closed  CV: RRR with no murmur; normal pulse  CHEST: Normal respiratory effort; CTAB; distant breath sounds; no wheeze or crackles  ABDOM:  nontender and nondistended; soft; normal bowel sounds; no rebound/guarding  MUSC/Skeletal: ROM normal; no crepitus; joints normal  EXTREM: no clubbing, cyanosis, inflammation or swelling, no edema or calf tenderness  SKIN:  Negative exam  : no sousa  NEURO: grossly intact; motor/sensory WNL; no tremors  PSYCH: normal mood, affect and behavior  LYMPH: normal cervical, supraclavicular, axillary and groin LN's  Breasts: NT, no palpable mass at L or R breast    I have reviewed all available lab results and radiology reports.    Na 121-117.    Assessment:   (1) 52 y.o. female with diagnosis of hyponatremia, SIADH likely.  Fluid restriction of 1,000cc daily. Sodium is stable at 121.    (2)RLL  Small cell lung cancer with locally advanced disease involving the mediastinum.  Appears improved per CAT after 5 cycles.  30-40% reduction in mass/dx.    I had planned to give her 6 cycles of chemo.  I think she is maxed out for now because of mental/ emotional exhaustion now.  Stop chemotherapy for now.    Refer to Dr. Vasquez for Rad Rx evaluation to chest Dx, discussion of  Prophylactic Rad Rx to brain.  RTC here 1 month, / further chemo later, doubtful.     washed approximately 8 oxycodone in washer.   notes her pain is not controlled.  Increase oxycodone 10mg 1 q 6 hours prn pain.

## 2017-12-22 DIAGNOSIS — R05.9 COUGH: Primary | ICD-10-CM

## 2017-12-22 RX ORDER — CODEINE PHOSPHATE AND GUAIFENESIN 10; 100 MG/5ML; MG/5ML
5 SOLUTION ORAL 3 TIMES DAILY PRN
Qty: 100 ML | Refills: 0 | Status: SHIPPED | OUTPATIENT
Start: 2017-12-22 | End: 2018-01-01

## 2018-01-04 ENCOUNTER — TELEPHONE (OUTPATIENT)
Dept: HEMATOLOGY/ONCOLOGY | Facility: CLINIC | Age: 53
End: 2018-01-04

## 2018-01-14 ENCOUNTER — DOCUMENTATION ONLY (OUTPATIENT)
Dept: HEMATOLOGY/ONCOLOGY | Facility: CLINIC | Age: 53
End: 2018-01-14

## 2018-01-15 NOTE — PROGRESS NOTES
Pt is in [facility].  Palliative care MD is seeing her.  She is leaning against the Rad Rx consult, and is seeing hospice in consult.

## 2018-01-16 ENCOUNTER — TELEPHONE (OUTPATIENT)
Dept: HEMATOLOGY/ONCOLOGY | Facility: CLINIC | Age: 53
End: 2018-01-16

## 2018-01-16 NOTE — TELEPHONE ENCOUNTER
Notified  that  Rx could be picked up at his convenience and she would need a follow up to see Dr. Anselmo FELIX

## 2018-01-16 NOTE — TELEPHONE ENCOUNTER
----- Message from Miriam Moore sent at 1/16/2018  9:48 AM CST -----  Patient called in requesting RX refill on Oxycodone 10mg #120 and Cheratussin. Please call Amilcar at [phone]. Thanks

## 2018-01-18 ENCOUNTER — OFFICE VISIT (OUTPATIENT)
Dept: HEMATOLOGY/ONCOLOGY | Facility: CLINIC | Age: 53
End: 2018-01-18
Payer: MEDICAID

## 2018-01-18 VITALS
WEIGHT: 124 LBS | SYSTOLIC BLOOD PRESSURE: 146 MMHG | HEART RATE: 92 BPM | DIASTOLIC BLOOD PRESSURE: 77 MMHG | BODY MASS INDEX: 24.22 KG/M2 | TEMPERATURE: 97 F | RESPIRATION RATE: 18 BRPM

## 2018-01-18 DIAGNOSIS — C34.11 MALIGNANT NEOPLASM OF UPPER LOBE OF RIGHT LUNG: Primary | ICD-10-CM

## 2018-01-18 DIAGNOSIS — F17.200 SMOKER: ICD-10-CM

## 2018-01-18 DIAGNOSIS — E22.2 SIADH (SYNDROME OF INAPPROPRIATE ADH PRODUCTION): ICD-10-CM

## 2018-01-18 DIAGNOSIS — R59.0 LYMPHADENOPATHY, MEDIASTINAL: ICD-10-CM

## 2018-01-18 DIAGNOSIS — J44.1 COPD EXACERBATION: ICD-10-CM

## 2018-01-18 PROCEDURE — 99213 OFFICE O/P EST LOW 20 MIN: CPT | Mod: ,,, | Performed by: INTERNAL MEDICINE

## 2018-01-18 RX ORDER — ALBUTEROL SULFATE 90 UG/1
1 AEROSOL, METERED RESPIRATORY (INHALATION)
COMMUNITY
Start: 2018-01-12 | End: 2019-01-12

## 2018-01-18 RX ORDER — FAMOTIDINE 20 MG/1
20 TABLET, FILM COATED ORAL
COMMUNITY
Start: 2018-01-12 | End: 2019-01-12

## 2018-01-18 RX ORDER — ALPRAZOLAM 0.25 MG/1
0.25 TABLET ORAL
COMMUNITY
Start: 2018-01-12 | End: 2019-01-12

## 2018-01-18 RX ORDER — OXYCODONE HYDROCHLORIDE 10 MG/1
10 TABLET ORAL
COMMUNITY
Start: 2018-01-12 | End: 2018-02-08 | Stop reason: SDUPTHER

## 2018-01-18 RX ORDER — ONDANSETRON HYDROCHLORIDE 8 MG/1
8 TABLET, FILM COATED ORAL
COMMUNITY
Start: 2018-01-12

## 2018-01-18 RX ORDER — PREDNISONE 10 MG/1
TABLET ORAL
COMMUNITY
Start: 2018-01-12

## 2018-01-18 RX ORDER — FLUTICASONE FUROATE AND VILANTEROL 200; 25 UG/1; UG/1
1 POWDER RESPIRATORY (INHALATION)
COMMUNITY
Start: 2018-01-12

## 2018-01-18 RX ORDER — CODEINE PHOSPHATE AND GUAIFENESIN 10; 100 MG/5ML; MG/5ML
5 SOLUTION ORAL
COMMUNITY
Start: 2018-01-12 | End: 2018-01-22

## 2018-01-18 RX ORDER — IPRATROPIUM BROMIDE AND ALBUTEROL SULFATE 2.5; .5 MG/3ML; MG/3ML
3 SOLUTION RESPIRATORY (INHALATION)
COMMUNITY
Start: 2018-01-12

## 2018-01-18 RX ORDER — PROMETHAZINE HYDROCHLORIDE 12.5 MG/1
12.5 TABLET ORAL
COMMUNITY
Start: 2018-01-12

## 2018-01-18 RX ORDER — POTASSIUM CHLORIDE 750 MG/1
10 TABLET, EXTENDED RELEASE ORAL
COMMUNITY
Start: 2018-01-12 | End: 2019-01-12

## 2018-01-18 RX ORDER — AMOXICILLIN 250 MG
1 CAPSULE ORAL
COMMUNITY
Start: 2018-01-12 | End: 2019-01-12

## 2018-01-18 RX ORDER — BENZONATATE 100 MG/1
100 CAPSULE ORAL
COMMUNITY
Start: 2018-01-12 | End: 2018-01-19

## 2018-01-18 NOTE — PROGRESS NOTES
[facility] History & Physical    Subjective:      Patient ID:   Maile Light  52 y.o. female  1965                                                                                  Steve Toledo MD.  Johny Vasquez MD.  Matty Guerra MD.      Chief Complaint:   RLL lung mass    HPI:  52 y.o. female with Evaluation for elective breast reduction.  Found to have Small Cell lung Cancer, locally advanced RUL dx.  Na 116-118.  Seen in ER, mass RLL.  SOB +, non productive cough.  No hemoptysis.  Appetite and weight stable.  Smoke 1 ppd x's 25 years.    She saw Dr. Toledo.  Bronchoscopy positve for tumor cells, likely small cell cancer, not clearly  Diagnostic.  Dr. Martinez for mediastinoscopy and  bx and tissue dx.  Preliminary report Small cell cancer of lung, final report confirmed the diagnosis.she is status post 5 cycle of carboplatin and -16. Nausea and vomiting symptoms have resolved. She is beginning to have some alopecia. No change in cough or shortness of breath, thus far.    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  Try to restrict po fluids to 1,000cc daily  To help with Na management.  Sodium at this time is stable at 121.     Small cell lung cancer,locally advanced dx.  S/P 3/4 cycle of Carboplatin, -16 x's 3 days.  S/P neulasta.    She was at [facility], Hgb 6, 2-3 units of pRBC given.  Na 121.  CXR no R hilar prominence seen, no mass seen.  Abd. Gas pattern NL.    Today c/o constipation, trial of chronulac syrup discussed.  Energy improved after pRBC.    HTN, LBP,hernia repair x's 7, mesh in place.  She wants breast reduction.    She is on Carboplatin, -16  D2C5.  C/O nausea, says she is tired of feeling bad, no good days on chemo.  She wants to hold chemo, she does not want to take it any more,  in aggreement with her.    They will take neulasta 12/1.  Later, will refer her to Rad Rx MD for evaluation for Rx.    She returns today.  Cough controlled with cough syrup.   Intermittent nose bleeds.  Check lab tomorrow HH.    Increase oxycodone 10mg 1 q 6 hours prn pain for pain control.    Chemotherapy is stopped for now, because of mental and physical exhaustion with chemotherapy Rx.  She does not aggree to further chemotherapy now, but will see the Rad Rx MD.    She was hospitalized with pneumonia, on antibiotics,  On 02 2l/m.  Appt Dr. kaplan 1/24/18 3:00.    Allergy no.  Job obrien,     Extensive abdomenal surgery, colostomy reversal, Dr. Eugene Parrish,   [facility], 3 years ago.  Lumbar back surgery x's 1.    Mom Ca ? Type., paralysis.  Dad GI cancer, colostomy.  10 Br and Sist, heart dx, renal dx, MVA death.    ROS:   GEN: normal without any fever, night sweats or weight loss  HEENT: See HPI.  CV: normal with no CP, SOB, PND, DE OLIVEIRA or orthopnea  PULM: See HPI.    GI: See HPI.   no abdominal pain, nausea, vomiting, constipation, diarrhea, melanotic stools, BRBPR, or hematemesis  : normal with no hematuria, dysuria  BREAST: normal with no mass, discharge, pain  SKIN: normal with no rash, erythema, bruising, or swelling       Social History     Social History    Marital status:      Spouse name: N/A    Number of children: N/A    Years of education: N/A     Occupational History    Not on file.     Social History Main Topics    Smoking status: Current Every Day Smoker     Packs/day: 0.25     Years: 25.00     Types: Cigarettes    Smokeless tobacco: Never Used    Alcohol use Yes      Comment: social    Drug use: No    Sexual activity: No     Other Topics Concern    Not on file     Social History Narrative    No narrative on file         Current Outpatient Prescriptions:     albuterol 90 mcg/actuation inhaler, 2 puffs every 4 hours as needed for cough, wheeze, or shortness of breath, Disp: 1 Inhaler, Rfl: 11    albuterol-ipratropium 2.5mg-0.5mg/3mL (DUO-NEB) 0.5 mg-3 mg(2.5 mg base)/3 mL nebulizer solution, Take 3 mLs by nebulization  every 6 (six) hours as needed., Disp: 120 vial, Rfl: 11    alprazolam (XANAX) 0.25 MG tablet, Take 1 tablet (0.25 mg total) by mouth 2 (two) times daily as needed for Anxiety., Disp: 60 tablet, Rfl: 0    alprazolam (XANAX) 0.25 MG tablet, Take 0.25 mg by mouth., Disp: , Rfl:     benzonatate (TESSALON) 100 MG capsule, Take 100 mg by mouth., Disp: , Rfl:     famotidine (PEPCID) 20 MG tablet, Take 20 mg by mouth., Disp: , Rfl:     fluticasone-vilanterol (BREO ELLIPTA) 200-25 mcg/dose DsDv diskus inhaler, Inhale 1 puff into the lungs once daily., Disp: 1 each, Rfl: 11    fluticasone-vilanterol (BREO) 200-25 mcg/dose DsDv diskus inhaler, Inhale 1 puff into the lungs., Disp: , Rfl:     guaifenesin-codeine 100-10 mg/5 ml (TUSSI-ORGANIDIN NR)  mg/5 mL syrup, Take 5 mLs by mouth., Disp: , Rfl:     ondansetron (ZOFRAN) 8 MG tablet, , Disp: , Rfl:     oxyCODONE (ROXICODONE) 10 mg Tab immediate release tablet, Take 10 mg by mouth., Disp: , Rfl:     potassium chloride (KLOR-CON) 10 MEQ TbSR, Take 10 mEq by mouth., Disp: , Rfl:     predniSONE (DELTASONE) 10 MG tablet, Take 4 daily for 3days then taper by 10 mg every 3 days, Disp: , Rfl:     promethazine (PHENERGAN) 12.5 MG Tab, Take 1 tablet (12.5 mg total) by mouth every 4 (four) hours as needed (nausea)., Disp: 60 tablet, Rfl: 0    promethazine (PHENERGAN) 12.5 MG Tab, Take 12.5 mg by mouth., Disp: , Rfl:     promethazine (PHENERGAN) 12.5 MG Tab, Take 12.5 mg by mouth., Disp: , Rfl:     senna-docusate 8.6-50 mg (PERICOLACE) 8.6-50 mg per tablet, Take 1 tablet by mouth., Disp: , Rfl:     albuterol 90 mcg/actuation inhaler, 2 puffs every 4 hours as needed for cough, wheeze, or shortness of breath, Disp: , Rfl:     albuterol 90 mcg/actuation inhaler, Inhale 1 puff into the lungs., Disp: , Rfl:     albuterol-ipratropium 2.5mg-0.5mg/3mL (DUO-NEB) 0.5 mg-3 mg(2.5 mg base)/3 mL nebulizer solution, Inhale 3 mLs into the lungs., Disp: , Rfl:      albuterol-ipratropium 2.5mg-0.5mg/3mL (DUO-NEB) 0.5 mg-3 mg(2.5 mg base)/3 mL nebulizer solution, 3 mLs., Disp: , Rfl:     ALPRAZolam (XANAX) 0.25 MG tablet, Take 0.25 mg by mouth., Disp: , Rfl:     fluticasone-vilanterol (BREO) 200-25 mcg/dose DsDv diskus inhaler, Inhale 1 puff into the lungs., Disp: , Rfl:     levoFLOXacin (LEVAQUIN) 500 MG tablet, Take 1 tablet (500 mg total) by mouth once daily., Disp: 7 tablet, Rfl: 0    ondansetron (ZOFRAN) 8 MG tablet, , Disp: , Rfl:     ondansetron (ZOFRAN) 8 MG tablet, Take 8 mg by mouth., Disp: , Rfl:     predniSONE (DELTASONE) 20 MG tablet, Take 2 daily for 3 days then One daily for 3 days and repeat for flare of lung symptoms as intructed, Disp: 24 tablet, Rfl: 0    predniSONE (DELTASONE) 20 MG tablet, Take 2 daily for 3 days then One daily for 3 days and repeat for flare of lung symptoms as intructed, Disp: , Rfl:           Objective:   Vitals:  Blood pressure (!) 146/77, pulse 92, temperature 97.2 °F (36.2 °C), resp. rate 18, weight 56.2 kg (124 lb).    Physical Examination:   GEN: appears stronger today, calmer  HEAD: atraumatic and normocephalic  EYES: no pallor, no icterus,   ENT: no swelling or CN palsy, she has oral candida.  NECK: neck incision site above sternum closed  CV: RRR with no murmur; normal pulse  CHEST: Normal respiratory effort; CTAB; distant breath sounds; no wheeze or crackles  ABDOM: nontender and nondistended; soft; normal bowel sounds; no rebound/guarding  MUSC/Skeletal: ROM normal; no crepitus; joints normal  EXTREM: no clubbing, cyanosis, inflammation or swelling, no edema or calf tenderness  SKIN:  Negative exam  : no sousa  NEURO: grossly intact; motor/sensory WNL; no tremors  PSYCH: normal mood, affect and behavior  LYMPH: normal cervical, supraclavicular, axillary and groin LN's  Breasts: NT, no palpable mass at L or R breast    Na 121-117.    Assessment:   (1) 52 y.o. female with diagnosis of hyponatremia, SIADH likely.  Fluid  restriction of 1,000cc daily. Sodium is stable at 121.    (2)RLL  Small cell lung cancer with locally advanced disease involving the mediastinum.  Appears improved per CAT after 5 cycles.  30-40% reduction in mass/dx.    I had planned to give her 6 cycles of chemo.  I think she is maxed out for now because of mental/ emotional exhaustion now.  Stop chemotherapy for now.    Refer to Dr. Vasquez for Rad Rx evaluation to chest Dx, discussion of  Prophylactic Rad Rx to brain.  RTC here 1 month, / further chemo later, doubtful.    Oral candida, trial of diflucan 100mg 1/d x's 7 days.    Appt Dr Vasquez 1/24/18.  RTC 1 month.

## 2018-01-24 ENCOUNTER — SOCIAL WORK (OUTPATIENT)
Dept: HEMATOLOGY/ONCOLOGY | Facility: CLINIC | Age: 53
End: 2018-01-24

## 2018-01-24 ENCOUNTER — DOCUMENTATION ONLY (OUTPATIENT)
Dept: RADIATION ONCOLOGY | Facility: CLINIC | Age: 53
End: 2018-01-24

## 2018-01-24 ENCOUNTER — OFFICE VISIT (OUTPATIENT)
Dept: RADIATION ONCOLOGY | Facility: CLINIC | Age: 53
End: 2018-01-24
Payer: MEDICAID

## 2018-01-24 VITALS
HEART RATE: 77 BPM | SYSTOLIC BLOOD PRESSURE: 134 MMHG | WEIGHT: 123 LBS | BODY MASS INDEX: 24.02 KG/M2 | DIASTOLIC BLOOD PRESSURE: 80 MMHG

## 2018-01-24 DIAGNOSIS — C34.11 MALIGNANT NEOPLASM OF UPPER LOBE OF RIGHT LUNG: Primary | ICD-10-CM

## 2018-01-24 PROCEDURE — 99205 OFFICE O/P NEW HI 60 MIN: CPT | Mod: ,,, | Performed by: RADIOLOGY

## 2018-01-24 NOTE — PROGRESS NOTES
Maile Kuldeep  [id number]  1965 1/24/2018  No referring provider defined for this encounter.    DIAGNOSIS: ES-SCLC, IIIB T4N3M0 RUL  TREATMENT SITE(S): RUL/mediastinum mass    INTENT: PALLIATIVE    TREATMENT SETTING: RT ALONE     MODALITY: PHOTON    TECHNIQUE:  3DCRT    IMRT MEDICAL NECESSITY: N/a    I have personally performed treatment planning for the patient, reviewing relevant history/physical and imaging. I have defined GTV, CTV, PTV and organs at risk.     In order to accomplish this plan, I am ordering:  SIMULATION: CT SIMULATION FOR PLACEMENT OF TREATMENT FIELDS    CONTRAST: IV    TO ACCOMPLISH REPRODUCIBLE POSITION: VACLOC    DEVICES FOR BEAM SHAPING: CUSTOMIZED MLC    CUSTOMIZED BOLUS: none    IMAGING: kv/kv daily, cbct weekly    I have ordered a weekly physics check.    SPECIAL PHYSICS CONSULT: NO  REASON: N/A    SPECIAL TREATMENT CIRCUMSTANCE: YES  Concurrent or recent administration of chemotherapeutic agents which are known potent radiosensitizers and thus will require vigilant monitoring for exaggerated radiation toxicities.    LABS: NONE    ANTICIPATED PRESCRIPTION: 3750cGy/15frx using 6/23X to isocenter    TREATMENT: DAILY    PHYSICIAN: Michael Guaman Jr, MD

## 2018-01-24 NOTE — PROGRESS NOTES
Maile Gonzalezers  [id number]  1965 1/24/2018  CHANI Briones Md  [address]  [address]  [place] [address]    REASON FOR CONSULTATION: ES-SCLC, IIIB T4N3M0 RUL  TREATMENT GOAL: palliative    HISTORY OF PRESENT ILLNESS:   52F who originally presented for elective breast reduction when imaging detected locally advanced right upper lobe tumor for which she underwent bronchoscopy positive for likely small cell carcinoma. She subsequently underwent mediastinoscopy and biopsy with final report confirming small cell lung cancer of the right lower lobe. She completed 5 cycles of carboplatin and etoposide and was intolerant and unable to proceed with 6th cycle. 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    *7/17 MRI B: gordon  *7/17 PET/CT  1. There is a hypermetabolic mass in the anterior segment of the right upper  lobe of the lung with a large pretracheal hypermetabolic mass. Findings  represent primary malignancy until proven otherwise.  There is slight increase in the size of the mediastinal mass compared to prior  outside CT of the chest dated 05/17/2017.  2. There is a hypermetabolic left supraclavicular lymph node, felt to represent  metastasis.  3. Scattered subsegmental areas of airspace opacities with mild hypermetabolism  in both upper lobes of the lungs, felt to be due to infectious or inflammatory  etiology including postradiation changes, with the appropriate clinical  circumstances.  4. No distal metastasis to the liver or skeletal system.  *11/17 CT C  IMPRESSION: PROMINENT REGRESSION IN SIZE OF THE RIGHT UPPER LOBE MASS AND RIGHT  HILAR AND MEDIASTINAL LYMPHADENOPATHY COMPARED TO 05/17/2017.  SCATTERED FOCAL GROUNDGLASS OPACITIES IN BOTH UPPER LUNGS AND RIGHT MIDDLE LOBE.  CORRELATE CLINICALLY FOR POSSIBLE RADIATION PNEUMONITIS.    *Dr Briones: consider RT; pt intolerant of CTX    At today's visit patient denies fever, chills, chest pain, hemoptysis, bone pain. She has no  vision, hearing, cognition, speech changes. She denies focal numbness or weakness. She does have nausea and it is responsive to her medication.    Review of Systems   Constitutional: Positive for fatigue. Negative for appetite change, chills and unexpected weight change.   HENT:   Negative for lump/mass, mouth sores, sore throat, trouble swallowing and voice change.    Eyes: Negative for eye problems and icterus.   Respiratory: Positive for cough and shortness of breath. Negative for hemoptysis.    Cardiovascular: Negative for chest pain and leg swelling.   Gastrointestinal: Positive for nausea. Negative for abdominal pain, constipation, diarrhea and vomiting.   Genitourinary: Negative for dysuria, frequency, hematuria, nocturia and vaginal bleeding.    Musculoskeletal: Negative for back pain, gait problem, neck pain and neck stiffness.   Neurological: Negative for extremity weakness, gait problem, headaches, numbness and seizures.   Hematological: Negative for adenopathy.     Past Medical History:   Diagnosis Date    Anxiety     Arthritis     Cancer     LUNG 7-17    Depression     Lung cancer     Pneumonia     Wears glasses      Past Surgical History:   Procedure Laterality Date    BTL       SECTION      COLON SURGERY      COLONOSCOPY      HERNIA MESH REMOVAL      HERNIA REPAIR      X 7 ABD     Social History     Social History    Marital status:      Spouse name: N/A    Number of children: N/A    Years of education: N/A     Social History Main Topics    Smoking status: Current Every Day Smoker     Packs/day: 0.25     Years: 25.00     Types: Cigarettes    Smokeless tobacco: Never Used    Alcohol use Yes      Comment: social    Drug use: No    Sexual activity: No     Other Topics Concern    None     Social History Narrative    None     Family History   Problem Relation Age of Onset    Cancer Mother     Cancer Father        PRIOR HISTORY OF CHEMOTHERAPY OR RADIOTHERAPY: Please  see HPI for patients prior oncologic history.    Medication List with Changes/Refills   Current Medications    ALBUTEROL 90 MCG/ACTUATION INHALER    2 puffs every 4 hours as needed for cough, wheeze, or shortness of breath    ALBUTEROL 90 MCG/ACTUATION INHALER    2 puffs every 4 hours as needed for cough, wheeze, or shortness of breath    ALBUTEROL 90 MCG/ACTUATION INHALER    Inhale 1 puff into the lungs.    ALBUTEROL-IPRATROPIUM 2.5MG-0.5MG/3ML (DUO-NEB) 0.5 MG-3 MG(2.5 MG BASE)/3 ML NEBULIZER SOLUTION    Take 3 mLs by nebulization every 6 (six) hours as needed.    ALBUTEROL-IPRATROPIUM 2.5MG-0.5MG/3ML (DUO-NEB) 0.5 MG-3 MG(2.5 MG BASE)/3 ML NEBULIZER SOLUTION    Inhale 3 mLs into the lungs.    ALBUTEROL-IPRATROPIUM 2.5MG-0.5MG/3ML (DUO-NEB) 0.5 MG-3 MG(2.5 MG BASE)/3 ML NEBULIZER SOLUTION    3 mLs.    ALPRAZOLAM (XANAX) 0.25 MG TABLET    Take 1 tablet (0.25 mg total) by mouth 2 (two) times daily as needed for Anxiety.    ALPRAZOLAM (XANAX) 0.25 MG TABLET    Take 0.25 mg by mouth.    ALPRAZOLAM (XANAX) 0.25 MG TABLET    Take 0.25 mg by mouth.    FAMOTIDINE (PEPCID) 20 MG TABLET    Take 20 mg by mouth.    FLUTICASONE-VILANTEROL (BREO ELLIPTA) 200-25 MCG/DOSE DSDV DISKUS INHALER    Inhale 1 puff into the lungs once daily.    FLUTICASONE-VILANTEROL (BREO) 200-25 MCG/DOSE DSDV DISKUS INHALER    Inhale 1 puff into the lungs.    FLUTICASONE-VILANTEROL (BREO) 200-25 MCG/DOSE DSDV DISKUS INHALER    Inhale 1 puff into the lungs.    LEVOFLOXACIN (LEVAQUIN) 500 MG TABLET    Take 1 tablet (500 mg total) by mouth once daily.    ONDANSETRON (ZOFRAN) 8 MG TABLET        ONDANSETRON (ZOFRAN) 8 MG TABLET        ONDANSETRON (ZOFRAN) 8 MG TABLET    Take 8 mg by mouth.    OXYCODONE (ROXICODONE) 10 MG TAB IMMEDIATE RELEASE TABLET    Take 10 mg by mouth.    POTASSIUM CHLORIDE (KLOR-CON) 10 MEQ TBSR    Take 10 mEq by mouth.    PREDNISONE (DELTASONE) 10 MG TABLET    Take 4 daily for 3days then taper by 10 mg every 3 days    PREDNISONE  (DELTASONE) 20 MG TABLET    Take 2 daily for 3 days then One daily for 3 days and repeat for flare of lung symptoms as intructed    PREDNISONE (DELTASONE) 20 MG TABLET    Take 2 daily for 3 days then One daily for 3 days and repeat for flare of lung symptoms as intructed    PROMETHAZINE (PHENERGAN) 12.5 MG TAB    Take 1 tablet (12.5 mg total) by mouth every 4 (four) hours as needed (nausea).    PROMETHAZINE (PHENERGAN) 12.5 MG TAB    Take 12.5 mg by mouth.    PROMETHAZINE (PHENERGAN) 12.5 MG TAB    Take 12.5 mg by mouth.    SENNA-DOCUSATE 8.6-50 MG (PERICOLACE) 8.6-50 MG PER TABLET    Take 1 tablet by mouth.     Review of patient's allergies indicates:  No Known Allergies    QUALITY OF LIFE: 90%- Able to Carry on Normal Activity: Minor Symptoms of Disease    Vitals:    01/24/18 1522   BP: 134/80   Pulse: 77   Weight: 55.8 kg (123 lb)   PainSc: 0-No pain       PHYSICAL EXAM:   GENERAL: alert; in no apparent distress.   HEAD: normocephalic, atraumatic. Alopecia  EYES: pupils are equal, round, reactive to light and accommodation. Sclera anicteric. Conjunctiva not injected.   NOSE/THROAT: no nasal erythema or rhinorrhea. Oropharynx pink, without erythema, ulcerations or thrush.   NECK: no cervical motion rigidity; supple with no masses.  CHEST: right upper lobe end expiratory wheeze; moving good air. No crackles.Patient is speaking comfortably on room air with normal work of breathing without using accessory muscles of respiration.  CARDIOVASCULAR: tachycardic with normal rhythm; no murmurs, rubs or gallops.  ABDOMEN: soft, nontender, nondistended. Bowel sounds present.   MUSCULOSKELETAL: no tenderness to palpation along the spine or scapulae. Normal range of motion.  NEUROLOGIC: cranial nerves II-XII intact bilaterally. Strength 5/5 in bilateral upper and lower extremities. No sensory deficits appreciated.  Normal gait.  LYMPHATIC: no cervical, supraclavicular adenopathy appreciated bilaterally.   EXTREMITIES: no  clubbing, cyanosis, edema.  SKIN: no erythema, rashes, ulcerations noted.     REVIEW OF IMAGING/PATHOLOGY/LABS: Please see HPI. All images reviewed personally by dictating physician.     ASSESSMENT: 52F with ES-SCLC, IIIB T4N3M0 RUL s/p 5c carbo/-16 with WA.   PLAN:   Mrs. Light has been diagnosed with extensive stage small cell lung carcinoma of the right upper lobe, originally presenting with a left supraclavicular lymph node. She has completed 5 cycles of chemotherapy and was intolerant could not have a 6th cycle, she presents with a partial response to discuss the role of radiotherapy. There is demonstrated efficacy in providing radiation therapy to the chest and a consolidative fashion in patients with extensive stage disease with had a good response to systemic therapy.  She qualifies and I would recommend a dose of 3750 cGy in 15 fractions to the chest disease. I explained to her that given the mediastinal component that this would likely cause some sore throat and potentially difficulty swallowing. I briefly discussed the role of PCI in extensive stage disease and explained that we would discuss this further at a later date. For now she would like to proceed with radiotherapy to the chest will bring her in for a contrasted CT simulation to begin planning.    We discussed the risks and benefits of the above treatment and have gone over in detail the acute and late toxicities of radiation therapy to the right lung. The patient expressed  understanding and has signed a consent form which is included in the patients chart. The patient has our contact information and understands that they are free to contact us at any time with questions or concerns regarding radiation therapy.    DISPOSITION: RTC FOR CT SIM    TIME SPENT WITH PATIENT: I have personally seen and evaluated this patient. Approximately one hour was spent in consultation with the patient, greater than 50% of this time was spent discussing the  personalized oncologic treatment plan and details of radiation therapy with the patient.     PHYSICIAN: Michael Guaman Jr, MD

## 2018-01-25 NOTE — PROGRESS NOTES
Met with patient and her  prior to her consult with Dr. Guaman to discuss her distress rating of 8.  I have been providing on-going supportive services since our initial meeting in August 2017.  Patient stated she continues to experience emotional highs and lows but is having a good day.  She continues to deny wanting psychosocial support at this time.  I will continue to provide supportive services as needed.

## 2018-01-30 ENCOUNTER — TREATMENT (OUTPATIENT)
Dept: RADIATION ONCOLOGY | Facility: CLINIC | Age: 53
End: 2018-01-30
Payer: MEDICAID

## 2018-01-30 PROCEDURE — 77334 RADIATION TREATMENT AID(S): CPT | Mod: ,,, | Performed by: RADIOLOGY

## 2018-01-30 PROCEDURE — 77263 THER RADIOLOGY TX PLNG CPLX: CPT | Mod: ,,, | Performed by: RADIOLOGY

## 2018-01-30 PROCEDURE — 77290 THER RAD SIMULAJ FIELD CPLX: CPT | Mod: ,,, | Performed by: RADIOLOGY

## 2018-01-31 PROCEDURE — 77300 RADIATION THERAPY DOSE PLAN: CPT | Mod: ,,, | Performed by: RADIOLOGY

## 2018-01-31 PROCEDURE — 77334 RADIATION TREATMENT AID(S): CPT | Mod: ,,, | Performed by: RADIOLOGY

## 2018-01-31 PROCEDURE — 77295 3-D RADIOTHERAPY PLAN: CPT | Mod: ,,, | Performed by: RADIOLOGY

## 2018-02-05 PROCEDURE — 77427 RADIATION TX MANAGEMENT X5: CPT | Mod: ,,, | Performed by: RADIOLOGY

## 2018-02-08 ENCOUNTER — TREATMENT (OUTPATIENT)
Dept: RADIATION ONCOLOGY | Facility: CLINIC | Age: 53
End: 2018-02-08
Payer: MEDICAID

## 2018-02-08 DIAGNOSIS — C34.11 MALIGNANT NEOPLASM OF UPPER LOBE OF RIGHT LUNG: Primary | ICD-10-CM

## 2018-02-08 PROCEDURE — G6014 RADIATION TREATMENT DELIVERY: HCPCS | Mod: ,,, | Performed by: RADIOLOGY

## 2018-02-08 PROCEDURE — G6002 STEREOSCOPIC X-RAY GUIDANCE: HCPCS | Mod: ,,, | Performed by: RADIOLOGY

## 2018-02-08 RX ORDER — OXYCODONE HYDROCHLORIDE 10 MG/1
10 TABLET ORAL EVERY 6 HOURS PRN
Qty: 60 TABLET | Refills: 0 | Status: SHIPPED | OUTPATIENT
Start: 2018-02-08 | End: 2018-02-18

## 2018-02-09 DIAGNOSIS — C34.11 MALIGNANT NEOPLASM OF UPPER LOBE OF RIGHT LUNG: Primary | ICD-10-CM

## 2018-02-09 RX ORDER — CODEINE PHOSPHATE AND GUAIFENESIN 10; 100 MG/5ML; MG/5ML
5 SOLUTION ORAL 3 TIMES DAILY PRN
Qty: 236 ML | Refills: 0 | Status: SHIPPED | OUTPATIENT
Start: 2018-02-09 | End: 2018-02-19

## 2018-02-12 PROCEDURE — 77427 RADIATION TX MANAGEMENT X5: CPT | Mod: ,,, | Performed by: RADIOLOGY

## 2018-02-19 ENCOUNTER — TREATMENT (OUTPATIENT)
Dept: RADIATION ONCOLOGY | Facility: CLINIC | Age: 53
End: 2018-02-19
Payer: MEDICAID

## 2018-02-19 PROCEDURE — G6014 RADIATION TREATMENT DELIVERY: HCPCS | Mod: ,,, | Performed by: RADIOLOGY

## 2018-02-19 PROCEDURE — G6002 STEREOSCOPIC X-RAY GUIDANCE: HCPCS | Mod: ,,, | Performed by: RADIOLOGY

## 2018-02-19 PROCEDURE — 77336 RADIATION PHYSICS CONSULT: CPT | Mod: ,,, | Performed by: RADIOLOGY

## 2018-02-20 PROCEDURE — 77427 RADIATION TX MANAGEMENT X5: CPT | Mod: ,,, | Performed by: RADIOLOGY

## 2018-02-21 ENCOUNTER — OFFICE VISIT (OUTPATIENT)
Dept: HEMATOLOGY/ONCOLOGY | Facility: CLINIC | Age: 53
End: 2018-02-21
Payer: MEDICAID

## 2018-02-21 VITALS
WEIGHT: 113 LBS | SYSTOLIC BLOOD PRESSURE: 148 MMHG | RESPIRATION RATE: 18 BRPM | HEART RATE: 78 BPM | BODY MASS INDEX: 22.07 KG/M2 | TEMPERATURE: 97 F | DIASTOLIC BLOOD PRESSURE: 68 MMHG

## 2018-02-21 DIAGNOSIS — C34.11 MALIGNANT NEOPLASM OF UPPER LOBE OF RIGHT LUNG: Primary | ICD-10-CM

## 2018-02-21 DIAGNOSIS — E22.2 SIADH (SYNDROME OF INAPPROPRIATE ADH PRODUCTION): ICD-10-CM

## 2018-02-21 DIAGNOSIS — J44.1 COPD EXACERBATION: ICD-10-CM

## 2018-02-21 DIAGNOSIS — F41.9 ANXIETY: ICD-10-CM

## 2018-02-21 PROCEDURE — 3008F BODY MASS INDEX DOCD: CPT | Mod: ,,, | Performed by: INTERNAL MEDICINE

## 2018-02-21 PROCEDURE — 99214 OFFICE O/P EST MOD 30 MIN: CPT | Mod: ,,, | Performed by: INTERNAL MEDICINE

## 2018-02-22 NOTE — PROGRESS NOTES
[facility] History & Physical    Subjective:      Patient ID:   Maile Light  52 y.o. female  1965                                                                                  Steve Toledo MD.  Johny Vasquez MD.  Matty Guerra MD.      Chief Complaint:   RLL lung mass    HPI:  52 y.o. female with Evaluation for elective breast reduction.  Found to have Small Cell lung Cancer, locally advanced RUL dx.  Na 116-118.  Seen in ER, mass RLL.  SOB +, non productive cough.  No hemoptysis.  Appetite and weight stable.  Smoke 1 ppd x's 25 years.    She saw Dr. Toledo.  Bronchoscopy positve for tumor cells, likely small cell cancer, not clearly  Diagnostic.  Dr. Martinez for mediastinoscopy and  bx and tissue dx.  Preliminary report Small cell cancer of lung, final report confirmed the diagnosis.she is status post 5 cycle of carboplatin and -16. Nausea and vomiting symptoms have resolved. She is beginning to have some alopecia. No change in cough or shortness of breath, thus far.    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  Try to restrict po fluids to 1,000cc daily  To help with Na management.  Sodium at this time is stable at 121.     Small cell lung cancer,locally advanced dx.  S/P 3/4 cycle of Carboplatin, -16 x's 3 days.  S/P neulasta.    She was at [facility], Hgb 6, 2-3 units of pRBC given.  Na 121.  CXR no R hilar prominence seen, no mass seen.  Abd. Gas pattern NL.    Today c/o constipation, trial of chronulac syrup discussed.  Energy improved after pRBC.    HTN, LBP,hernia repair x's 7, mesh in place.  She wants breast reduction.    She is on Carboplatin, -16  D2C5.  C/O nausea, says she is tired of feeling bad, no good days on chemo.  She wants to hold chemo, she does not want to take it any more,  in aggreement with her.    They will take neulasta 12/1.  Later, will refer her to Rad Rx MD for evaluation for Rx.    She returns today.  Cough controlled with cough syrup.   Intermittent nose bleeds.  Check lab tomorrow HH.    Increase oxycodone 10mg 1 q 6 hours prn pain for pain control.    Chemotherapy is stopped for now, because of mental and physical exhaustion with chemotherapy Rx.  She does not aggree to further chemotherapy now.    She was hospitalized with pneumonia, on antibiotics,  On 02 2l/m.  Appt Dr. Vasquez on radiation rx through 2/26/18.  Stronger, eating better, but wt at 113#.    Allergy no.  Job obrien,     Extensive abdomenal surgery, colostomy reversal, Dr. Eugene Parrish,   [facility] [place], 3 years ago.  Lumbar back surgery x's 1.    Mom Ca ? Type., paralysis.  Dad GI cancer, colostomy.  10 Br and Sist, heart dx, renal dx, MVA death.    ROS:   GEN: normal without any fever, night sweats or weight loss  HEENT: See HPI.  CV: normal with no CP, SOB, PND, DE OLIVEIRA or orthopnea  PULM: See HPI.    GI: See HPI.   no abdominal pain, nausea, vomiting, constipation, diarrhea, melanotic stools, BRBPR, or hematemesis  : normal with no hematuria, dysuria  BREAST: normal with no mass, discharge, pain  SKIN: normal with no rash, erythema, bruising, or swelling       Social History     Social History    Marital status:      Spouse name: N/A    Number of children: N/A    Years of education: N/A     Occupational History    Not on file.     Social History Main Topics    Smoking status: Current Every Day Smoker     Packs/day: 0.25     Years: 25.00     Types: Cigarettes    Smokeless tobacco: Never Used    Alcohol use Yes      Comment: social    Drug use: No    Sexual activity: No     Other Topics Concern    Not on file     Social History Narrative    No narrative on file         Current Outpatient Prescriptions:     albuterol 90 mcg/actuation inhaler, 2 puffs every 4 hours as needed for cough, wheeze, or shortness of breath, Disp: 1 Inhaler, Rfl: 11    albuterol-ipratropium 2.5mg-0.5mg/3mL (DUO-NEB) 0.5 mg-3 mg(2.5 mg base)/3 mL nebulizer  solution, Take 3 mLs by nebulization every 6 (six) hours as needed., Disp: 120 vial, Rfl: 11    alprazolam (XANAX) 0.25 MG tablet, Take 1 tablet (0.25 mg total) by mouth 2 (two) times daily as needed for Anxiety., Disp: 60 tablet, Rfl: 0    famotidine (PEPCID) 20 MG tablet, Take 20 mg by mouth., Disp: , Rfl:     fluticasone-vilanterol (BREO ELLIPTA) 200-25 mcg/dose DsDv diskus inhaler, Inhale 1 puff into the lungs once daily., Disp: 1 each, Rfl: 11    ondansetron (ZOFRAN) 8 MG tablet, , Disp: , Rfl:     promethazine (PHENERGAN) 12.5 MG Tab, Take 1 tablet (12.5 mg total) by mouth every 4 (four) hours as needed (nausea)., Disp: 60 tablet, Rfl: 0    senna-docusate 8.6-50 mg (PERICOLACE) 8.6-50 mg per tablet, Take 1 tablet by mouth., Disp: , Rfl:     albuterol 90 mcg/actuation inhaler, 2 puffs every 4 hours as needed for cough, wheeze, or shortness of breath, Disp: , Rfl:     albuterol 90 mcg/actuation inhaler, Inhale 1 puff into the lungs., Disp: , Rfl:     albuterol-ipratropium 2.5mg-0.5mg/3mL (DUO-NEB) 0.5 mg-3 mg(2.5 mg base)/3 mL nebulizer solution, Inhale 3 mLs into the lungs., Disp: , Rfl:     albuterol-ipratropium 2.5mg-0.5mg/3mL (DUO-NEB) 0.5 mg-3 mg(2.5 mg base)/3 mL nebulizer solution, 3 mLs., Disp: , Rfl:     alprazolam (XANAX) 0.25 MG tablet, Take 0.25 mg by mouth., Disp: , Rfl:     ALPRAZolam (XANAX) 0.25 MG tablet, Take 0.25 mg by mouth., Disp: , Rfl:     fluticasone-vilanterol (BREO) 200-25 mcg/dose DsDv diskus inhaler, Inhale 1 puff into the lungs., Disp: , Rfl:     fluticasone-vilanterol (BREO) 200-25 mcg/dose DsDv diskus inhaler, Inhale 1 puff into the lungs., Disp: , Rfl:     levoFLOXacin (LEVAQUIN) 500 MG tablet, Take 1 tablet (500 mg total) by mouth once daily., Disp: 7 tablet, Rfl: 0    ondansetron (ZOFRAN) 8 MG tablet, , Disp: , Rfl:     ondansetron (ZOFRAN) 8 MG tablet, Take 8 mg by mouth., Disp: , Rfl:     oxyCODONE (ROXICODONE) 10 mg Tab immediate release tablet, Take 1  tablet (10 mg total) by mouth every 6 (six) hours as needed for Pain., Disp: 60 tablet, Rfl: 0    potassium chloride (KLOR-CON) 10 MEQ TbSR, Take 10 mEq by mouth., Disp: , Rfl:     predniSONE (DELTASONE) 10 MG tablet, Take 4 daily for 3days then taper by 10 mg every 3 days, Disp: , Rfl:     predniSONE (DELTASONE) 20 MG tablet, Take 2 daily for 3 days then One daily for 3 days and repeat for flare of lung symptoms as intructed, Disp: 24 tablet, Rfl: 0    predniSONE (DELTASONE) 20 MG tablet, Take 2 daily for 3 days then One daily for 3 days and repeat for flare of lung symptoms as intructed, Disp: , Rfl:     promethazine (PHENERGAN) 12.5 MG Tab, Take 12.5 mg by mouth., Disp: , Rfl:     promethazine (PHENERGAN) 12.5 MG Tab, Take 12.5 mg by mouth., Disp: , Rfl:           Objective:   Vitals:  Blood pressure (!) 148/68, pulse 78, temperature 97.2 °F (36.2 °C), resp. rate 18, weight 51.3 kg (113 lb).    Physical Examination:   GEN: appears stronger today, calmer  HEAD: atraumatic and normocephalic  EYES: no pallor, no icterus,   ENT: no swelling or CN palsy, she has oral candida.  NECK: neck incision site above sternum closed  CV: RRR with no murmur; normal pulse  CHEST: Normal respiratory effort; CTAB; distant breath sounds; no wheeze or crackles  ABDOM: nontender and nondistended; soft; normal bowel sounds; no rebound/guarding  MUSC/Skeletal: ROM normal; no crepitus; joints normal  EXTREM: no clubbing, cyanosis, inflammation or swelling, no edema or calf tenderness  SKIN:  Darkening of skin over chest and back at Rad Rx field.  : no sousa  NEURO: grossly intact; motor/sensory WNL; no tremors  PSYCH: normal mood, affect and behavior  LYMPH: normal cervical, supraclavicular, axillary and groin LN's  Breasts: NT, no palpable mass at L or R breast    Na 121-117.    Assessment:   (1) 52 y.o. female with diagnosis of hyponatremia, SIADH likely.  Fluid restriction of 1,000cc daily. Sodium is stable at 121.    (2)RLL   Small cell lung cancer with locally advanced disease involving the mediastinum.  Appears improved per CAT after 5 cycles.  30-40% reduction in mass/dx.    I had planned to give her 6 cycles of chemo.  I think she is maxed out for now because of mental/ emotional exhaustion now.  Stop chemotherapy for now.    Referred  to Dr. Vasquez for Rad Rx evaluation to chest Dx, discussion of  Prophylactic Rad Rx to brain.  Completes chest rad rx 2/26/18.    Refill meds, add marinol 2.5 mg to increase appetite.  RTC 1 month.

## 2018-02-23 ENCOUNTER — TREATMENT (OUTPATIENT)
Dept: RADIATION ONCOLOGY | Facility: CLINIC | Age: 53
End: 2018-02-23
Payer: MEDICAID

## 2018-02-23 PROCEDURE — 77014 PR  CT GUIDANCE PLACEMENT RAD THERAPY FIELDS: CPT | Mod: ,,, | Performed by: RADIOLOGY

## 2018-02-23 PROCEDURE — G6014 RADIATION TREATMENT DELIVERY: HCPCS | Mod: ,,, | Performed by: RADIOLOGY

## 2018-02-26 ENCOUNTER — TREATMENT (OUTPATIENT)
Dept: RADIATION ONCOLOGY | Facility: CLINIC | Age: 53
End: 2018-02-26
Payer: MEDICAID

## 2018-02-26 PROCEDURE — G6014 RADIATION TREATMENT DELIVERY: HCPCS | Mod: ,,, | Performed by: RADIOLOGY

## 2018-02-26 PROCEDURE — G6002 STEREOSCOPIC X-RAY GUIDANCE: HCPCS | Mod: ,,, | Performed by: RADIOLOGY

## 2018-02-26 PROCEDURE — 77336 RADIATION PHYSICS CONSULT: CPT | Mod: ,,, | Performed by: RADIOLOGY

## 2018-03-20 ENCOUNTER — OFFICE VISIT (OUTPATIENT)
Dept: RADIATION ONCOLOGY | Facility: CLINIC | Age: 53
End: 2018-03-20
Payer: MEDICAID

## 2018-03-20 DIAGNOSIS — C34.11 MALIGNANT NEOPLASM OF UPPER LOBE OF RIGHT LUNG: Primary | ICD-10-CM

## 2018-03-20 PROCEDURE — 99024 POSTOP FOLLOW-UP VISIT: CPT | Mod: ,,, | Performed by: RADIOLOGY

## 2018-03-20 NOTE — PROGRESS NOTES
Maile Light  [id number]  1965  3/20/2018  No referring provider defined for this encounter.    DIAGNOSIS: Small cell lung cancer  REASON FOR VISIT: Routine scheduled follow-up.    HISTORY OF PRESENT ILLNESS:   52-year-old patient with a diagnosis of small cell lung cancer bulky disease of the chest mediastinum staged T4N3M0, status post 5 cycles of carboplatin and -16 and consolidated radiation therapy to the chest completed about 3 weeks ago. She was treated with palliative intent to 37.5 gray in 15 fractions.  She did well with the treatment course. She did notice some improvement in her breathing status posttreatment. She did have some esophagitis symptoms which has resolved.. She recently underwent a chest x-ray which showed a marked reduction in size of the tumor mass. I do note a CAT scan after her chemotherapy showed at least a 40% reduction in size of the mediastinal disease.      INTERVAL HISTORY:   Since finishing her treatment she has returned to eating a fairly normal diet. She has begun to gain some weight. She still uses oxygen. She doesn't describe any worsening cough, no hemoptysis and no pain in the chest or neck area. She did have pain in the tailbone recently was referred for MRI imaging but this was unable to be carried out because they could not get a vein for contrast. They did a plain film of her sacral area which showed no evidence of fracture.    She reports no neurological changes, no headache, no seizure activity or weakness in the face or extremities.  She was has a diagnosis of SIADH which I believe is resolving.    She presents today to discuss the role of prophylactic cranial irradiation or PCI.    Review of Systems   Cardiovascular: Negative for chest pain and leg swelling.   Gastrointestinal: Negative for abdominal distention and abdominal pain.   Musculoskeletal: Positive for back pain. Negative for gait problem and neck pain.   Neurological: Negative for dizziness, gait  problem, headaches, numbness, seizures and speech difficulty.     Past Medical History:   Diagnosis Date    Anxiety     Arthritis     Cancer     LUNG 7-17    Depression     Lung cancer     Pneumonia     Wears glasses      Past Surgical History:   Procedure Laterality Date    BTL       SECTION      COLON SURGERY      COLONOSCOPY      HERNIA MESH REMOVAL      HERNIA REPAIR      X 7 ABD     Social History     Social History    Marital status:      Spouse name: N/A    Number of children: N/A    Years of education: N/A     Social History Main Topics    Smoking status: Current Every Day Smoker     Packs/day: 0.25     Years: 25.00     Types: Cigarettes    Smokeless tobacco: Never Used    Alcohol use Yes      Comment: social    Drug use: No    Sexual activity: No     Other Topics Concern    Not on file     Social History Narrative    No narrative on file     Family History   Problem Relation Age of Onset    Cancer Mother     Cancer Father      Medication List with Changes/Refills   Current Medications    ALBUTEROL 90 MCG/ACTUATION INHALER    2 puffs every 4 hours as needed for cough, wheeze, or shortness of breath    ALBUTEROL 90 MCG/ACTUATION INHALER    2 puffs every 4 hours as needed for cough, wheeze, or shortness of breath    ALBUTEROL 90 MCG/ACTUATION INHALER    Inhale 1 puff into the lungs.    ALBUTEROL-IPRATROPIUM 2.5MG-0.5MG/3ML (DUO-NEB) 0.5 MG-3 MG(2.5 MG BASE)/3 ML NEBULIZER SOLUTION    Take 3 mLs by nebulization every 6 (six) hours as needed.    ALBUTEROL-IPRATROPIUM 2.5MG-0.5MG/3ML (DUO-NEB) 0.5 MG-3 MG(2.5 MG BASE)/3 ML NEBULIZER SOLUTION    Inhale 3 mLs into the lungs.    ALBUTEROL-IPRATROPIUM 2.5MG-0.5MG/3ML (DUO-NEB) 0.5 MG-3 MG(2.5 MG BASE)/3 ML NEBULIZER SOLUTION    3 mLs.    ALPRAZOLAM (XANAX) 0.25 MG TABLET    Take 1 tablet (0.25 mg total) by mouth 2 (two) times daily as needed for Anxiety.    ALPRAZOLAM (XANAX) 0.25 MG TABLET    Take 0.25 mg by mouth.     ALPRAZOLAM (XANAX) 0.25 MG TABLET    Take 0.25 mg by mouth.    FAMOTIDINE (PEPCID) 20 MG TABLET    Take 20 mg by mouth.    FLUTICASONE-VILANTEROL (BREO ELLIPTA) 200-25 MCG/DOSE DSDV DISKUS INHALER    Inhale 1 puff into the lungs once daily.    FLUTICASONE-VILANTEROL (BREO) 200-25 MCG/DOSE DSDV DISKUS INHALER    Inhale 1 puff into the lungs.    FLUTICASONE-VILANTEROL (BREO) 200-25 MCG/DOSE DSDV DISKUS INHALER    Inhale 1 puff into the lungs.    LEVOFLOXACIN (LEVAQUIN) 500 MG TABLET    Take 1 tablet (500 mg total) by mouth once daily.    ONDANSETRON (ZOFRAN) 8 MG TABLET        ONDANSETRON (ZOFRAN) 8 MG TABLET        ONDANSETRON (ZOFRAN) 8 MG TABLET    Take 8 mg by mouth.    OXYCODONE (ROXICODONE) 10 MG TAB IMMEDIATE RELEASE TABLET    Take 1 tablet (10 mg total) by mouth every 6 (six) hours as needed for Pain.    POTASSIUM CHLORIDE (KLOR-CON) 10 MEQ TBSR    Take 10 mEq by mouth.    PREDNISONE (DELTASONE) 10 MG TABLET    Take 4 daily for 3days then taper by 10 mg every 3 days    PREDNISONE (DELTASONE) 20 MG TABLET    Take 2 daily for 3 days then One daily for 3 days and repeat for flare of lung symptoms as intructed    PREDNISONE (DELTASONE) 20 MG TABLET    Take 2 daily for 3 days then One daily for 3 days and repeat for flare of lung symptoms as intructed    PROMETHAZINE (PHENERGAN) 12.5 MG TAB    Take 1 tablet (12.5 mg total) by mouth every 4 (four) hours as needed (nausea).    PROMETHAZINE (PHENERGAN) 12.5 MG TAB    Take 12.5 mg by mouth.    PROMETHAZINE (PHENERGAN) 12.5 MG TAB    Take 12.5 mg by mouth.    SENNA-DOCUSATE 8.6-50 MG (PERICOLACE) 8.6-50 MG PER TABLET    Take 1 tablet by mouth.     Review of patient's allergies indicates:  No Known Allergies    QUALITY OF LIFE: 70%- Cares for Self: Unable to Carry on Normal Activity or Active Work    There were no vitals filed for this visit.    PHYSICAL EXAM: Alert and oriented lady no distress answers questions well, does appear weak, but is ambulatory  GENERAL: alert;  in no apparent distress.   HEAD: normocephalic, atraumatic.  EYES: pupils are equal, round, reactive to light and accommodation. Sclera anicteric. Conjunctiva not injected.   NOSE/THROAT: no nasal erythema or rhinorrhea. Oropharynx pink, without erythema, ulcerations or thrush.   NECK: no cervical motion rigidity; supple with no masses.  CHEST: clear to auscultation bilaterally; no wheezes, crackles or rubs. Patient is speaking comfortably on room air with normal work of breathing without using accessory muscles of respiration.  CARDIOVASCULAR: regular rate and rhythm; no murmurs, rubs or gallops.  ABDOMEN: soft, nontender, nondistended. Bowel sounds present.   MUSCULOSKELETAL: no tenderness to palpation along the spine or scapulae. Normal range of motion.  NEUROLOGIC: cranial nerves II-XII intact bilaterally. Strength 4/5 in bilateral upper and lower extremities. No sensory deficits appreciated. Reflexes globally intact. No cerebellar signs. Normal gait.  LYMPHATIC: no cervical, supraclavicular or axillary adenopathy appreciated bilaterally.   EXTREMITIES: no clubbing, cyanosis, edema.  SKIN: no erythema, rashes, ulcerations noted.     ANCILLARY DATA:     ASSESSMENT: Very pleasant patient with locally advanced small cell lung cancer of the lung, stage IIIb, with bulky mediastinal disease completed palliative ration therapy 3 weeks ago recovering well, with good intrathoracic response noted.  PLAN:  Because she has had a good response to treatment noted clinically she is a candidate for PCI. I will order a CAT scan of the chest to confirm her disease state at this point. I will also order a CAT scan of the brain to rule out active disease in the brain.    If she has a confirmed intrathoracic response and no metastatic brain disease we will proceed with prophylactic brain radiation.     The recommended dose for this is 25 gray in 10 fractions..    If she does have active brain metastasis this will be addressed with a  different radiation protocol depending on number of lesions, possibly a stereotactic approach or whole brain radiation therapy again if active disease is identified in the brain.    She will require steroid use during the brain radiation therapy will order this for her.  I discussed the rationale of PCI with the patient and her , which is shown to be beneficial in patients with small cell lung cancer having had intrathoracic good response. I told the PCI can eradicate microscopic disease in that small cell lung cancer tends to metastasize to the brain.  I disclose a side effects of treatment including not limited to loss of hair scalp irritation, headache possible seizure activity possible long-term steroid use and long-term effects including but not limited to loss of thinking ability, memory problems, cognitive deficits.    All questions were addressed    All questions answered and contact information provided. Patient understands free to call us anytime with any questions or concerns regarding radiation therapy.    TIME SPENT WITH PATIENT: I have personally seen and evaluated this patient. Greater than 50% of this time was spent discussing coordination of care and/or counseling.      PHYSICIAN: Johny Vasquez MD

## 2018-03-21 ENCOUNTER — OFFICE VISIT (OUTPATIENT)
Dept: HEMATOLOGY/ONCOLOGY | Facility: CLINIC | Age: 53
End: 2018-03-21
Payer: MEDICAID

## 2018-03-21 VITALS
RESPIRATION RATE: 22 BRPM | DIASTOLIC BLOOD PRESSURE: 64 MMHG | BODY MASS INDEX: 21.68 KG/M2 | SYSTOLIC BLOOD PRESSURE: 95 MMHG | HEART RATE: 111 BPM | WEIGHT: 111 LBS | TEMPERATURE: 98 F

## 2018-03-21 DIAGNOSIS — M79.18 CHRONIC BUTTOCK PAIN: ICD-10-CM

## 2018-03-21 DIAGNOSIS — C34.11 MALIGNANT NEOPLASM OF UPPER LOBE OF RIGHT LUNG: Primary | ICD-10-CM

## 2018-03-21 DIAGNOSIS — G89.29 CHRONIC BUTTOCK PAIN: ICD-10-CM

## 2018-03-21 PROCEDURE — 99214 OFFICE O/P EST MOD 30 MIN: CPT | Mod: ,,, | Performed by: INTERNAL MEDICINE

## 2018-03-21 NOTE — PROGRESS NOTES
[facility] History & Physical    Subjective:      Patient ID:   Maile Light  52 y.o. female  1965                                                                                  Steve Toledo MD.  Johny Vasquez MD.  Matty Guerra MD.      Chief Complaint:   RLL lung mass    HPI:  52 y.o. female with Evaluation for elective breast reduction.  Found to have Small Cell lung Cancer, locally advanced RUL dx.  Na 116-118.  Seen in ER, mass RLL.  SOB +, non productive cough.  No hemoptysis.  Appetite and weight stable.  Smoke 1 ppd x's 25 years.    She saw Dr. Toledo.  Bronchoscopy positve for tumor cells, likely small cell cancer, not clearly  Diagnostic.  Dr. Martinez for mediastinoscopy and  bx and tissue dx.  Preliminary report Small cell cancer of lung, final report confirmed the diagnosis.she is status post 5 cycle of carboplatin and -16. Nausea and vomiting symptoms have resolved. She is beginning to have some alopecia. No change in cough or shortness of breath, thus far.    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  Try to restrict po fluids to 1,000cc daily  To help with Na management.  Sodium at this time is stable at 121.     Small cell lung cancer,locally advanced dx.  S/P 3/4 cycle of Carboplatin, -16 x's 3 days.  S/P neulasta.    She was at [facility], Hgb 6, 2-3 units of pRBC given.  Na 121.  CXR no R hilar prominence seen, no mass seen.  Abd. Gas pattern NL.    Today c/o constipation, trial of chronulac syrup discussed.  Energy improved after pRBC.    HTN, LBP,hernia repair x's 7, mesh in place.  She wants breast reduction.    She is on Carboplatin, -16  D2C5.  C/O nausea, says she is tired of feeling bad, no good days on chemo.  She wants to hold chemo, she does not want to take it any more,  in aggreement with her.    They will take neulasta 12/1.  Later, will refer her to Rad Rx MD for evaluation for Rx.    She returns today.  Cough controlled with cough syrup.   Intermittent nose bleeds.  Check lab tomorrow HH.    Increase oxycodone 10mg 1 q 6 hours prn pain for pain control.    Chemotherapy is stopped for now, because of mental and physical exhaustion with chemotherapy Rx.  She does not aggree to further chemotherapy now.    She was hospitalized with pneumonia, on antibiotics,  On 02 2l/m.  Appt Dr. Vasquez on radiation rx through 2/26/18.  Stronger, eating better, but wt at 113#.  Getting CAT of chest and brain.  May have steriotactic chest Rad Rx and prophylactic cranial Rad Rx.  Dr. Vasquez.    Allergy no.  Job micah,     Extensive abdomenal surgery, colostomy reversal, Dr. Eugene Parrish,   [facility], 3 years ago.  Lumbar back surgery x's 1.    Mom Ca ? Type., paralysis.  Dad GI cancer, colostomy.  10 Br and Sist, heart dx, renal dx, MVA death.    ROS:   GEN: normal without any fever, night sweats or weight loss  HEENT: See HPI.  CV: normal with no CP, SOB, PND, DE OLIVEIRA or orthopnea  PULM: See HPI.    GI: See HPI.   no abdominal pain, nausea, vomiting, constipation, diarrhea, melanotic stools, BRBPR, or hematemesis  : normal with no hematuria, dysuria  BREAST: normal with no mass, discharge, pain  SKIN: normal with no rash, erythema, bruising, or swelling       Social History     Social History    Marital status:      Spouse name: N/A    Number of children: N/A    Years of education: N/A     Occupational History    Not on file.     Social History Main Topics    Smoking status: Current Every Day Smoker     Packs/day: 0.25     Years: 25.00     Types: Cigarettes    Smokeless tobacco: Never Used    Alcohol use Yes      Comment: social    Drug use: No    Sexual activity: No     Other Topics Concern    Not on file     Social History Narrative    No narrative on file         Current Outpatient Prescriptions:     albuterol 90 mcg/actuation inhaler, 2 puffs every 4 hours as needed for cough, wheeze, or shortness of breath, Disp: 1  Inhaler, Rfl: 11    albuterol-ipratropium 2.5mg-0.5mg/3mL (DUO-NEB) 0.5 mg-3 mg(2.5 mg base)/3 mL nebulizer solution, Take 3 mLs by nebulization every 6 (six) hours as needed., Disp: 120 vial, Rfl: 11    alprazolam (XANAX) 0.25 MG tablet, Take 1 tablet (0.25 mg total) by mouth 2 (two) times daily as needed for Anxiety., Disp: 60 tablet, Rfl: 0    famotidine (PEPCID) 20 MG tablet, Take 20 mg by mouth., Disp: , Rfl:     fluticasone-vilanterol (BREO ELLIPTA) 200-25 mcg/dose DsDv diskus inhaler, Inhale 1 puff into the lungs once daily., Disp: 1 each, Rfl: 11    ondansetron (ZOFRAN) 8 MG tablet, , Disp: , Rfl:     senna-docusate 8.6-50 mg (PERICOLACE) 8.6-50 mg per tablet, Take 1 tablet by mouth., Disp: , Rfl:     albuterol 90 mcg/actuation inhaler, 2 puffs every 4 hours as needed for cough, wheeze, or shortness of breath, Disp: , Rfl:     albuterol 90 mcg/actuation inhaler, Inhale 1 puff into the lungs., Disp: , Rfl:     albuterol-ipratropium 2.5mg-0.5mg/3mL (DUO-NEB) 0.5 mg-3 mg(2.5 mg base)/3 mL nebulizer solution, Inhale 3 mLs into the lungs., Disp: , Rfl:     albuterol-ipratropium 2.5mg-0.5mg/3mL (DUO-NEB) 0.5 mg-3 mg(2.5 mg base)/3 mL nebulizer solution, 3 mLs., Disp: , Rfl:     alprazolam (XANAX) 0.25 MG tablet, Take 0.25 mg by mouth., Disp: , Rfl:     ALPRAZolam (XANAX) 0.25 MG tablet, Take 0.25 mg by mouth., Disp: , Rfl:     fluticasone-vilanterol (BREO) 200-25 mcg/dose DsDv diskus inhaler, Inhale 1 puff into the lungs., Disp: , Rfl:     fluticasone-vilanterol (BREO) 200-25 mcg/dose DsDv diskus inhaler, Inhale 1 puff into the lungs., Disp: , Rfl:     levoFLOXacin (LEVAQUIN) 500 MG tablet, Take 1 tablet (500 mg total) by mouth once daily., Disp: 7 tablet, Rfl: 0    ondansetron (ZOFRAN) 8 MG tablet, , Disp: , Rfl:     ondansetron (ZOFRAN) 8 MG tablet, Take 8 mg by mouth., Disp: , Rfl:     oxyCODONE (ROXICODONE) 10 mg Tab immediate release tablet, Take 1 tablet (10 mg total) by mouth every 6 (six)  hours as needed for Pain., Disp: 60 tablet, Rfl: 0    potassium chloride (KLOR-CON) 10 MEQ TbSR, Take 10 mEq by mouth., Disp: , Rfl:     predniSONE (DELTASONE) 10 MG tablet, Take 4 daily for 3days then taper by 10 mg every 3 days, Disp: , Rfl:     predniSONE (DELTASONE) 20 MG tablet, Take 2 daily for 3 days then One daily for 3 days and repeat for flare of lung symptoms as intructed, Disp: 24 tablet, Rfl: 0    predniSONE (DELTASONE) 20 MG tablet, Take 2 daily for 3 days then One daily for 3 days and repeat for flare of lung symptoms as intructed, Disp: , Rfl:     promethazine (PHENERGAN) 12.5 MG Tab, Take 1 tablet (12.5 mg total) by mouth every 4 (four) hours as needed (nausea)., Disp: 60 tablet, Rfl: 0    promethazine (PHENERGAN) 12.5 MG Tab, Take 12.5 mg by mouth., Disp: , Rfl:     promethazine (PHENERGAN) 12.5 MG Tab, Take 12.5 mg by mouth., Disp: , Rfl:           Objective:   Vitals:  Blood pressure 95/64, pulse (!) 111, temperature 98 °F (36.7 °C), resp. rate (!) 22, weight 50.3 kg (111 lb).    Physical Examination:   GEN: appears stronger today, calmer  HEAD: atraumatic and normocephalic  EYES: no pallor, no icterus,   ENT: no swelling or CN palsy, she has oral candida.  NECK: neck incision site above sternum closed  CV: RRR with no murmur; normal pulse  CHEST: Normal respiratory effort; CTAB; distant breath sounds; no wheeze or crackles  ABDOM: nontender and nondistended; soft; normal bowel sounds; no rebound/guarding, L/S NP  MUSC/Skeletal: ROM normal; no crepitus; joints normal  EXTREM: no clubbing, cyanosis, inflammation or swelling, no edema or calf tenderness  SKIN:  Darkening of skin over chest and back at Rad Rx field.  : no sousa  NEURO: grossly intact; motor/sensory WNL; no tremors  PSYCH: normal mood, affect and behavior  LYMPH: normal cervical, supraclavicular, axillary and groin LN's  Breasts: NT, no palpable mass at L or R breast    Na 121-117.    Assessment:   (1) 52 y.o. female with  diagnosis of hyponatremia, SIADH likely.  Fluid restriction of 1,000cc daily. Sodium is stable at 121.    (2)RLL  Small cell lung cancer with locally advanced disease involving the mediastinum.  Appears improved per CAT after 5 cycles.  30-40% reduction in mass/dx.    I had planned to give her 6 cycles of chemo.  I think she is maxed out for now because of mental/ emotional exhaustion now.  Stop chemotherapy for now.    Referred  to Dr. Vasquez for Rad Rx evaluation to chest Dx, discussion of  Prophylactic Rad Rx to brain.  Completes chest rad rx 2/26/18.  Being evaluated for steriotactic rad rx and prophylactic brain irradiation.,  Dr. Vasquez.    Refill meds, add periactin to increase appetite.  RTC 1 month.

## 2018-03-22 DIAGNOSIS — C34.11 MALIGNANT NEOPLASM OF UPPER LOBE OF RIGHT LUNG: Primary | ICD-10-CM

## 2018-03-22 RX ORDER — DEXAMETHASONE 4 MG/1
4 TABLET ORAL EVERY 12 HOURS
Qty: 60 TABLET | Refills: 4 | Status: SHIPPED | OUTPATIENT
Start: 2018-03-22

## 2018-03-27 ENCOUNTER — DOCUMENTATION ONLY (OUTPATIENT)
Dept: RADIATION ONCOLOGY | Facility: CLINIC | Age: 53
End: 2018-03-27

## 2018-03-27 ENCOUNTER — TELEPHONE (OUTPATIENT)
Dept: RADIATION ONCOLOGY | Facility: CLINIC | Age: 53
End: 2018-03-27

## 2018-03-27 NOTE — TELEPHONE ENCOUNTER
Spoke with Mrs. Light following her conversation with Dr. Vasquez. Gave her an appointment for CT Simulation. Also instructed patient to begin taking her prescription steroid that was called into her pharmacy. Patients daughter in law was on the phone as well, she expressed understanding and stated that Mrs. Light has already began the steroid medication.

## 2018-03-27 NOTE — PROGRESS NOTES
Patient presented last week to discuss the possibility of prophylactic brain radiation therapy for her small cell lung cancer condition.    I want ahead and ordered restaging studies including a CT scan of the chest and MRI of the brain.        Unfortunately the MRI the brain is showing multiple metastatic lesions involving both hemispheres and the cerebellum, 11 and total.  In addition she does have numerous liver metastasis, consistent with her metastatic disease process. The intrathoracic disease showing a response to her previous treatment.      Because of the numerous brain lesions we will go ahead simulate her for treatment to the whole brain. Because of the large number lesions a stereotactic approach is not warranted and in addition she does have progressive systemic disease.    I placed her on Decadron 4 mg twice a day.  We will have her return to us for simulation of whole brain therapy anticipating a dose of 30 gray in 10 fractions.      The rationale, side effects possible, and possible complications of treatment were discussed with the patient.

## 2018-03-29 ENCOUNTER — DOCUMENTATION ONLY (OUTPATIENT)
Dept: RADIATION ONCOLOGY | Facility: CLINIC | Age: 53
End: 2018-03-29

## 2018-03-29 PROCEDURE — 77263 THER RADIOLOGY TX PLNG CPLX: CPT | Mod: ,,, | Performed by: RADIOLOGY

## 2018-03-29 NOTE — PROGRESS NOTES
Maile Light  [id number]  1965  3/29/2018  No referring provider defined for this encounter.    DIAGNOSIS: metastatic small cell carcinoma lung  TREATMENT SITE(S): whole brain    INTENT: PALLIATIVE    TREATMENT SETTING: RT ALONE     MODALITY: PHOTON    TECHNIQUE:  3D CONFORMAL RADIOTHERAPY (3DCRT) with DIODES    IMRT MEDICAL NECESSITY: N/A    I have personally performed treatment planning for the patient, reviewing relevant history/physical and imaging. I have defined GTV, CTV, PTV and organs at risk.     In order to accomplish this plan, I am ordering:  SIMULATION: CT SIMULATION FOR PLACEMENT OF TREATMENT FIELDS    CONTRAST: none    TO ACCOMPLISH REPRODUCIBLE POSITION: AQUAPLAST MASK    DEVICES FOR BEAM SHAPING: CUSTOMIZED MLC    CUSTOMIZED BOLUS: none    IMAGING: WEEKLY PORTALS    I have ordered a weekly physics check.    SPECIAL PHYSICS CONSULT: NO  REASON: N/A    SPECIAL TREATMENT CIRCUMSTANCE: NO  Concurrent or recent administration of chemotherapeutic agents which are known potent radiosensitizers and thus will require vigilant monitoring for exaggerated radiation toxicities.    LABS: NONE    ANTICIPATED PRESCRIPTION TO ISOCENTER: 30Gy/10frx    ENERGY: 6X    TREATMENT: DAILY    PHYSICIAN: Michael Guaman Jr, MD

## 2018-04-02 ENCOUNTER — TREATMENT (OUTPATIENT)
Dept: RADIATION ONCOLOGY | Facility: CLINIC | Age: 53
End: 2018-04-02
Payer: MEDICAID

## 2018-04-02 PROCEDURE — 77290 THER RAD SIMULAJ FIELD CPLX: CPT | Mod: ,,, | Performed by: RADIOLOGY

## 2018-04-02 PROCEDURE — 77334 RADIATION TREATMENT AID(S): CPT | Mod: ,,, | Performed by: RADIOLOGY

## 2018-04-03 PROCEDURE — 77300 RADIATION THERAPY DOSE PLAN: CPT | Mod: ,,, | Performed by: RADIOLOGY

## 2018-04-03 PROCEDURE — 77295 3-D RADIOTHERAPY PLAN: CPT | Mod: ,,, | Performed by: RADIOLOGY

## 2018-04-03 PROCEDURE — 77334 RADIATION TREATMENT AID(S): CPT | Mod: ,,, | Performed by: RADIOLOGY

## 2018-04-06 DIAGNOSIS — B37.0 THRUSH: Primary | ICD-10-CM

## 2018-04-12 RX ORDER — FLUCONAZOLE 100 MG/1
TABLET ORAL
Qty: 16 TABLET | Refills: 0 | Status: SHIPPED | OUTPATIENT
Start: 2018-04-12

## 2018-04-13 DIAGNOSIS — R60.9 SWELLING: Primary | ICD-10-CM

## 2018-04-13 RX ORDER — METHYLPREDNISOLONE 4 MG/1
TABLET ORAL
Qty: 1 PACKAGE | Refills: 0 | Status: SHIPPED | OUTPATIENT
Start: 2018-04-13 | End: 2018-05-04

## 2018-04-25 ENCOUNTER — OFFICE VISIT (OUTPATIENT)
Dept: HEMATOLOGY/ONCOLOGY | Facility: CLINIC | Age: 53
End: 2018-04-25
Payer: MEDICAID

## 2018-04-25 VITALS — TEMPERATURE: 96 F | RESPIRATION RATE: 18 BRPM | DIASTOLIC BLOOD PRESSURE: 68 MMHG | SYSTOLIC BLOOD PRESSURE: 96 MMHG

## 2018-04-25 DIAGNOSIS — C79.31 LUNG CANCER METASTATIC TO BRAIN: ICD-10-CM

## 2018-04-25 DIAGNOSIS — C34.11 MALIGNANT NEOPLASM OF UPPER LOBE OF RIGHT LUNG: Primary | ICD-10-CM

## 2018-04-25 DIAGNOSIS — C34.90 LUNG CANCER METASTATIC TO BRAIN: ICD-10-CM

## 2018-04-25 DIAGNOSIS — J44.1 COPD EXACERBATION: ICD-10-CM

## 2018-04-25 DIAGNOSIS — E22.2 SIADH (SYNDROME OF INAPPROPRIATE ADH PRODUCTION): ICD-10-CM

## 2018-04-25 DIAGNOSIS — C34.90 ADENOCARCINOMA OF LUNG METASTATIC TO LIVER, UNSPECIFIED LATERALITY: ICD-10-CM

## 2018-04-25 DIAGNOSIS — C78.7 ADENOCARCINOMA OF LUNG METASTATIC TO LIVER, UNSPECIFIED LATERALITY: ICD-10-CM

## 2018-04-25 PROCEDURE — 99214 OFFICE O/P EST MOD 30 MIN: CPT | Mod: ,,, | Performed by: INTERNAL MEDICINE

## 2018-04-25 NOTE — LETTER
April 29, 2018      Ricci Guerra MD  [address]  [place] [address]           [facility]  [address]  [place] [address]  Phone: [phone]  Fax: [phone]          Patient: Maile Light   MR Number: [id number]   YOB: 1965   Date of Visit: 4/25/2018       Dear Dr. Ricci Guerra:    Thank you for referring Maile Light to me for evaluation. Attached you will find relevant portions of my assessment and plan of care.    If you have questions, please do not hesitate to call me. I look forward to following Maile Light along with you.    Sincerely,    CHANI Briones MD    Enclosure  CC:  No Recipients    If you would like to receive this communication electronically, please contact [email or web address] or [phone] to request more information on [employer] access.    For providers and/or their staff who would like to refer a patient to [facility], please contact us through our one-stop-shop provider referral line, [facility], at [phone].    If you feel you have received this communication in error or would no longer like to receive these types of communications, please e-mail [email or web address]

## 2018-04-29 NOTE — PROGRESS NOTES
[facility] History & Physical    Subjective:      Patient ID:   Maile Light  52 y.o. female  1965                                                                                  Steve Toledo MD.  Johny Vasquez MD.  Matty Guerra MD.      Chief Complaint:   RLL lung mass    HPI:  52 y.o. female with Evaluation for elective breast reduction.  Found to have Small Cell lung Cancer, locally advanced RUL dx.  Na 116-118.  Seen in ER, mass RLL.  SOB +, non productive cough.  No hemoptysis.  Appetite and weight stable.  Smoke 1 ppd x's 25 years.    She saw Dr. Toledo.  Bronchoscopy positve for tumor cells, likely small cell cancer, not clearly  Diagnostic.  Dr. Martinez for mediastinoscopy and  bx and tissue dx.  Preliminary report Small cell cancer of lung, final report confirmed the diagnosis.she is status post 5 cycle of carboplatin and -16. Nausea and vomiting symptoms have resolved. She is beginning to have some alopecia. No change in cough or shortness of breath, thus far.    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  Try to restrict po fluids to 1,000cc daily  To help with Na management.  Sodium at this time is stable at 121.     Small cell lung cancer,locally advanced dx.  S/P 3/4 cycle of Carboplatin, -16 x's 3 days.  S/P neulasta.    She was at [facility], Hgb 6, 2-3 units of pRBC given.  Na 121.  CXR no R hilar prominence seen, no mass seen.  Abd. Gas pattern NL.    Today c/o constipation, trial of chronulac syrup discussed.  Energy improved after pRBC.    HTN, LBP,hernia repair x's 7, mesh in place.  She wants breast reduction.    She is on Carboplatin, -16  D2C5.  C/O nausea, says she is tired of feeling bad, no good days on chemo.  She wants to hold chemo, she does not want to take it any more,  in aggreement with her.    They will take neulasta 12/1.  Later, will refer her to Rad Rx MD for evaluation for Rx.    She returns today.  Cough controlled with cough syrup.   Intermittent nose bleeds.  Check lab tomorrow HH.    Increase oxycodone 10mg 1 q 6 hours prn pain for pain control.    Chemotherapy is stopped for now, because of mental and physical exhaustion with chemotherapy Rx.  She does not aggree to further chemotherapy now.    She was hospitalized with pneumonia, on antibiotics,  On 02 2l/m.  Appt Dr. Vasquez on radiation rx through 2/26/18.  Stronger, eating better, but wt at 113#.  Getting CAT of chest and brain.  May have steriotactic chest Rad Rx and prophylactic cranial Rad Rx.  Dr. Vasquez.    She developed brain metastases, and recently completed brain Rad Rx.  Now with extensive liver metastases, increased frailty.  Her daughters are with her.  She is in [facility].  She is on 02 and appears comfortable.    Mom Ca ? Type., paralysis.  Dad GI cancer, colostomy.  10 Br and Sist, heart dx, renal dx, MVA death.    ROS:   GEN: normal without any fever, night sweats or weight loss  HEENT: See HPI.  CV: normal with no CP, SOB, PND, DE OLIVEIRA or orthopnea  PULM: See HPI.  On 02.  GI: See HPI.   + abdominal pain.  No nausea, vomiting, constipation, diarrhea, melanotic stools, BRBPR, or hematemesis  : normal with no hematuria, dysuria  BREAST: normal with no mass, discharge, pain  SKIN: normal with no rash, erythema, bruising, or swelling       Social History     Social History    Marital status:      Spouse name: N/A    Number of children: N/A    Years of education: N/A     Occupational History    Not on file.     Social History Main Topics    Smoking status: Current Every Day Smoker     Packs/day: 0.25     Years: 25.00     Types: Cigarettes    Smokeless tobacco: Never Used    Alcohol use Yes      Comment: social    Drug use: No    Sexual activity: No     Other Topics Concern    Not on file     Social History Narrative    No narrative on file         Current Outpatient Prescriptions:     albuterol 90 mcg/actuation inhaler, 2 puffs every 4 hours as  needed for cough, wheeze, or shortness of breath, Disp: 1 Inhaler, Rfl: 11    albuterol 90 mcg/actuation inhaler, Inhale 1 puff into the lungs., Disp: , Rfl:     albuterol-ipratropium 2.5mg-0.5mg/3mL (DUO-NEB) 0.5 mg-3 mg(2.5 mg base)/3 mL nebulizer solution, 3 mLs., Disp: , Rfl:     ALPRAZolam (XANAX) 0.25 MG tablet, Take 0.25 mg by mouth., Disp: , Rfl:     dexamethasone (DECADRON) 4 MG Tab, Take 1 tablet (4 mg total) by mouth every 12 (twelve) hours. Start on first day of radiation, Disp: 60 tablet, Rfl: 4    famotidine (PEPCID) 20 MG tablet, Take 20 mg by mouth., Disp: , Rfl:     fluconazole (DIFLUCAN) 100 MG tablet, Take 2 tabs on day 1. Then take 1 tab daily, Disp: 16 tablet, Rfl: 0    fluticasone-vilanterol (BREO) 200-25 mcg/dose DsDv diskus inhaler, Inhale 1 puff into the lungs., Disp: , Rfl:     ondansetron (ZOFRAN) 8 MG tablet, Take 8 mg by mouth., Disp: , Rfl:     potassium chloride (KLOR-CON) 10 MEQ TbSR, Take 10 mEq by mouth., Disp: , Rfl:     predniSONE (DELTASONE) 20 MG tablet, Take 2 daily for 3 days then One daily for 3 days and repeat for flare of lung symptoms as intructed, Disp: 24 tablet, Rfl: 0    promethazine (PHENERGAN) 12.5 MG Tab, Take 12.5 mg by mouth., Disp: , Rfl:     senna-docusate 8.6-50 mg (PERICOLACE) 8.6-50 mg per tablet, Take 1 tablet by mouth., Disp: , Rfl:     albuterol 90 mcg/actuation inhaler, 2 puffs every 4 hours as needed for cough, wheeze, or shortness of breath, Disp: , Rfl:     albuterol-ipratropium 2.5mg-0.5mg/3mL (DUO-NEB) 0.5 mg-3 mg(2.5 mg base)/3 mL nebulizer solution, Take 3 mLs by nebulization every 6 (six) hours as needed., Disp: 120 vial, Rfl: 11    albuterol-ipratropium 2.5mg-0.5mg/3mL (DUO-NEB) 0.5 mg-3 mg(2.5 mg base)/3 mL nebulizer solution, Inhale 3 mLs into the lungs., Disp: , Rfl:     alprazolam (XANAX) 0.25 MG tablet, Take 1 tablet (0.25 mg total) by mouth 2 (two) times daily as needed for Anxiety., Disp: 60 tablet, Rfl: 0    alprazolam  (XANAX) 0.25 MG tablet, Take 0.25 mg by mouth., Disp: , Rfl:     fluticasone-vilanterol (BREO ELLIPTA) 200-25 mcg/dose DsDv diskus inhaler, Inhale 1 puff into the lungs once daily., Disp: 1 each, Rfl: 11    fluticasone-vilanterol (BREO) 200-25 mcg/dose DsDv diskus inhaler, Inhale 1 puff into the lungs., Disp: , Rfl:     levoFLOXacin (LEVAQUIN) 500 MG tablet, Take 1 tablet (500 mg total) by mouth once daily., Disp: 7 tablet, Rfl: 0    methylPREDNISolone (MEDROL DOSEPACK) 4 mg tablet, use as directed, Disp: 1 Package, Rfl: 0    ondansetron (ZOFRAN) 8 MG tablet, , Disp: , Rfl:     ondansetron (ZOFRAN) 8 MG tablet, , Disp: , Rfl:     oxyCODONE (ROXICODONE) 10 mg Tab immediate release tablet, Take 1 tablet (10 mg total) by mouth every 6 (six) hours as needed for Pain., Disp: 60 tablet, Rfl: 0    predniSONE (DELTASONE) 10 MG tablet, Take 4 daily for 3days then taper by 10 mg every 3 days, Disp: , Rfl:     predniSONE (DELTASONE) 20 MG tablet, Take 2 daily for 3 days then One daily for 3 days and repeat for flare of lung symptoms as intructed, Disp: , Rfl:     promethazine (PHENERGAN) 12.5 MG Tab, Take 1 tablet (12.5 mg total) by mouth every 4 (four) hours as needed (nausea)., Disp: 60 tablet, Rfl: 0    promethazine (PHENERGAN) 12.5 MG Tab, Take 12.5 mg by mouth., Disp: , Rfl:           Objective:   Vitals:  Blood pressure 96/68, temperature (!) 95.6 °F (35.3 °C), temperature source Axillary, resp. rate 18.    Physical Examination:   GEN: frailty increased, tachyneic with 02.  HEAD: atraumatic and normocephalic  EYES: no pallor, no icterus,   ENT: no swelling or CN palsy  NECK: neck incision site above sternum closed  CV: RRR with no murmur; normal pulse  CHEST: Increased respiratory effort; CTAB; distant breath sounds; no wheeze or crackles  ABDOM: RUQ tender +.   nondistended; soft; normal bowel sounds; no rebound/guarding, L/S NP  MUSC/Skeletal: ROM normal; no crepitus; joints normal  EXTREM: no clubbing,  cyanosis, inflammation or swelling, no edema or calf tenderness  SKIN:  Darkening of skin over chest and back at Rad Rx field.  : no sousa  NEURO: grossly intact; motor/sensory WNL; no tremors  PSYCH: normal mood, affect and behavior  LYMPH: normal cervical, supraclavicular, axillary and groin LN's  Breasts: NT, no palpable mass at L or R breast        Assessment:   (1) 52 y.o. female with diagnosis of hyponatremia, SIADH likely.      (2)RLL  Small cell lung cancer with locally advanced disease and extensive metastatic dx.  I do not feel she would tolerate chemotherapy again.  I have recommended Hospice enrollment, comfort care.  Survival < 1-2 months expected.    RTC 1 month, if she want to come in and is physically able to come in.

## 2018-07-16 ENCOUNTER — TELEPHONE (OUTPATIENT)
Dept: HEMATOLOGY/ONCOLOGY | Facility: CLINIC | Age: 53
End: 2018-07-16

## 2023-05-04 NOTE — PLAN OF CARE
Problem: Patient Care Overview  Goal: Plan of Care Review  Outcome: Ongoing (interventions implemented as appropriate)  Pt on king air with Q6 PRn duoneb and daily Breo       Bactrim Counseling:  I discussed with the patient the risks of sulfa antibiotics including but not limited to GI upset, allergic reaction, drug rash, diarrhea, dizziness, photosensitivity, and yeast infections.  Rarely, more serious reactions can occur including but not limited to aplastic anemia, agranulocytosis, methemoglobinemia, blood dyscrasias, liver or kidney failure, lung infiltrates or desquamative/blistering drug rashes.

## (undated) DEVICE — PACK CUSTOM UNIV BASIN SLI

## (undated) DEVICE — DRESSING TRANS 4X4 TEGADERM

## (undated) DEVICE — APPLICATOR CHLORAPREP ORN 26ML

## (undated) DEVICE — SLEEVE SCD EXPRESS CALF MEDIUM

## (undated) DEVICE — SUT 3/0 18IN COATED VICRYLP

## (undated) DEVICE — GLOVE SURG ULTRA TOUCH 7.5

## (undated) DEVICE — HEMOSTAT SURGICEL 2X14IN

## (undated) DEVICE — SUT 3-0 VICRYL / SH (J416)

## (undated) DEVICE — NDL ECLIPSE SAFETY 18GX1-1/2IN

## (undated) DEVICE — SEE MEDLINE ITEM 152622

## (undated) DEVICE — DRESSING AQUACEL AG SILVER 4X4

## (undated) DEVICE — SEE MEDLINE ITEM 157117

## (undated) DEVICE — SUT CTD VICRYL 4-0 BR PS-2

## (undated) DEVICE — DRESSING TELFA N ADH 3X8

## (undated) DEVICE — GAUZE SPONGE BULKEE 6X6.75IN

## (undated) DEVICE — CONTAINER SPECIMEN STRL 4OZ

## (undated) DEVICE — PACK DRAPE UNIVERSAL CONVERTOR